# Patient Record
Sex: MALE | Race: WHITE
[De-identification: names, ages, dates, MRNs, and addresses within clinical notes are randomized per-mention and may not be internally consistent; named-entity substitution may affect disease eponyms.]

---

## 2018-10-15 ENCOUNTER — HOSPITAL ENCOUNTER (INPATIENT)
Dept: HOSPITAL 53 - M ED | Age: 63
LOS: 3 days | Discharge: HOME | DRG: 282 | End: 2018-10-18
Attending: INTERNAL MEDICINE | Admitting: INTERNAL MEDICINE
Payer: COMMERCIAL

## 2018-10-15 DIAGNOSIS — J44.9: ICD-10-CM

## 2018-10-15 DIAGNOSIS — K85.90: Primary | ICD-10-CM

## 2018-10-15 DIAGNOSIS — Z90.49: ICD-10-CM

## 2018-10-15 DIAGNOSIS — I10: ICD-10-CM

## 2018-10-15 DIAGNOSIS — M10.9: ICD-10-CM

## 2018-10-15 DIAGNOSIS — Z79.1: ICD-10-CM

## 2018-10-15 DIAGNOSIS — Z79.899: ICD-10-CM

## 2018-10-15 DIAGNOSIS — F17.210: ICD-10-CM

## 2018-10-15 DIAGNOSIS — Z92.21: ICD-10-CM

## 2018-10-15 DIAGNOSIS — Z92.3: ICD-10-CM

## 2018-10-15 DIAGNOSIS — E78.5: ICD-10-CM

## 2018-10-15 DIAGNOSIS — Z85.048: ICD-10-CM

## 2018-10-15 LAB
ALBUMIN/GLOBULIN RATIO: 1.19 (ref 1–1.93)
ALBUMIN: 3.7 GM/DL (ref 3.2–5.2)
ALKALINE PHOSPHATASE: 76 U/L (ref 45–117)
ALT SERPL W P-5'-P-CCNC: 17 U/L (ref 12–78)
AMYLASE SERPL-CCNC: 46 U/L (ref 25–115)
ANION GAP: 9 MEQ/L (ref 8–16)
AST SERPL-CCNC: 11 U/L (ref 7–37)
BASO #: 0.1 10^3/UL (ref 0–0.2)
BASO %: 0.4 % (ref 0–1)
BILIRUB CONJ SERPL-MCNC: 0.3 MG/DL (ref 0–0.2)
BILIRUBIN,TOTAL: 0.7 MG/DL (ref 0.2–1)
BLOOD UREA NITROGEN: 14 MG/DL (ref 7–18)
CALCIUM LEVEL: 9.1 MG/DL (ref 8.8–10.2)
CARBON DIOXIDE LEVEL: 28 MEQ/L (ref 21–32)
CHLORIDE LEVEL: 100 MEQ/L (ref 98–107)
CK MB CFR.DF SERPL CALC: 2.42
CK SERPL-CCNC: 66 U/L (ref 39–308)
CK-MB VALUE MASS: 2 NG/ML (ref ?–3.6)
CREATININE FOR GFR: 0.7 MG/DL (ref 0.7–1.3)
EOS #: 0 10^3/UL (ref 0–0.5)
EOSINOPHIL NFR BLD AUTO: 0.2 % (ref 0–3)
GFR SERPL CREATININE-BSD FRML MDRD: > 60 ML/MIN/{1.73_M2} (ref 49–?)
GLUCOSE, FASTING: 131 MG/DL (ref 70–100)
HEMATOCRIT: 47.8 % (ref 42–52)
HEMOGLOBIN: 15.8 G/DL (ref 13.5–17.5)
IMMATURE GRANULOCYTE %: 0.4 % (ref 0–3)
INR: 0.96
LACTIC ACID SEPSIS PROTOCOL: 1.4 MMOL/L (ref 0.4–2)
LEUKOCYTE ESTERASE UR AUTO RFX: NEGATIVE
LIPASE: 259 U/L (ref 73–393)
LYMPH #: 0.6 10^3/UL (ref 1.5–4.5)
LYMPH %: 3.6 % (ref 24–44)
MEAN CORPUSCULAR HEMOGLOBIN: 30.7 PG (ref 27–33)
MEAN CORPUSCULAR HGB CONC: 33.1 G/DL (ref 32–36.5)
MEAN CORPUSCULAR VOLUME: 93 FL (ref 80–96)
MONO #: 1 10^3/UL (ref 0–0.8)
MONO %: 5.8 % (ref 0–5)
NEUTROPHILS #: 14.7 10^3/UL (ref 1.8–7.7)
NEUTROPHILS %: 89.6 % (ref 36–66)
NRBC BLD AUTO-RTO: 0 % (ref 0–0)
PARTIAL THROMBOPLASTIN TIME: 26.4 SECONDS (ref 25.4–37.6)
PLATELET COUNT, AUTOMATED: 142 10^3/UL (ref 150–450)
POTASSIUM SERUM: 4.2 MEQ/L (ref 3.5–5.1)
PROTHROMBIN TIME: 12.9 SECONDS (ref 12.1–14.4)
RED BLOOD COUNT: 5.14 10^6/UL (ref 4.3–6.1)
RED CELL DISTRIBUTION WIDTH: 12.5 % (ref 11.5–14.5)
SODIUM LEVEL: 137 MEQ/L (ref 136–145)
SPECIFIC GRAVITY UR AUTO RFX: 1.02 (ref 1–1.03)
SQUAM EPITHELIAL CELL UR AURFX: 0 /HPF (ref 0–6)
TOTAL PROTEIN: 6.8 GM/DL (ref 6.4–8.2)
TROPONIN I: < 0.02 NG/ML (ref ?–0.1)
WHITE BLOOD COUNT: 16.4 10^3/UL (ref 4–10)

## 2018-10-15 RX ADMIN — SODIUM CHLORIDE 1 MLS/HR: 9 INJECTION, SOLUTION INTRAVENOUS at 08:19

## 2018-10-15 RX ADMIN — MONTELUKAST SODIUM 1 MG: 10 TABLET, FILM COATED ORAL at 16:43

## 2018-10-15 RX ADMIN — ATORVASTATIN CALCIUM 1 MG: 20 TABLET, FILM COATED ORAL at 16:43

## 2018-10-15 RX ADMIN — ALLOPURINOL 1 MG: 300 TABLET ORAL at 16:43

## 2018-10-15 RX ADMIN — ACETAMINOPHEN 1 MG: 325 TABLET ORAL at 14:48

## 2018-10-15 RX ADMIN — ONDANSETRON 1 MG: 2 INJECTION INTRAMUSCULAR; INTRAVENOUS at 08:19

## 2018-10-15 RX ADMIN — MORPHINE SULFATE 1 MG: 4 INJECTION INTRAVENOUS at 10:19

## 2018-10-15 RX ADMIN — SODIUM CHLORIDE 1 MLS/HR: 9 INJECTION, SOLUTION INTRAVENOUS at 14:11

## 2018-10-15 RX ADMIN — ENOXAPARIN SODIUM 1 MG: 40 INJECTION SUBCUTANEOUS at 14:49

## 2018-10-15 RX ADMIN — MORPHINE SULFATE 1 MG: 4 INJECTION INTRAVENOUS at 17:53

## 2018-10-15 RX ADMIN — MORPHINE SULFATE 1 MG: 2 INJECTION, SOLUTION INTRAMUSCULAR; INTRAVENOUS at 08:19

## 2018-10-15 RX ADMIN — SODIUM CHLORIDE 1 MLS/HR: 9 INJECTION, SOLUTION INTRAVENOUS at 10:45

## 2018-10-15 RX ADMIN — MORPHINE SULFATE 1 MG: 4 INJECTION INTRAVENOUS at 20:45

## 2018-10-15 RX ADMIN — SODIUM CHLORIDE 1 MLS/HR: 9 INJECTION, SOLUTION INTRAVENOUS at 20:45

## 2018-10-15 RX ADMIN — LISINOPRIL 1 MG: 20 TABLET ORAL at 16:43

## 2018-10-15 RX ADMIN — FLUTICASONE PROPIONATE AND SALMETEROL XINAFOATE 1 PUFF: 230; 21 AEROSOL, METERED RESPIRATORY (INHALATION) at 20:03

## 2018-10-16 LAB
ALBUMIN/GLOBULIN RATIO: 0.83 (ref 1–1.93)
ALBUMIN: 3 GM/DL (ref 3.2–5.2)
ALKALINE PHOSPHATASE: 63 U/L (ref 45–117)
ALT SERPL W P-5'-P-CCNC: 12 U/L (ref 12–78)
ANION GAP: 4 MEQ/L (ref 8–16)
AST SERPL-CCNC: 7 U/L (ref 7–37)
BILIRUB CONJ SERPL-MCNC: 0.2 MG/DL (ref 0–0.2)
BILIRUBIN,TOTAL: 0.5 MG/DL (ref 0.2–1)
BLOOD UREA NITROGEN: 14 MG/DL (ref 7–18)
CALCIUM LEVEL: 9 MG/DL (ref 8.8–10.2)
CARBON DIOXIDE LEVEL: 33 MEQ/L (ref 21–32)
CHLORIDE LEVEL: 102 MEQ/L (ref 98–107)
CREATININE FOR GFR: 0.66 MG/DL (ref 0.7–1.3)
GFR SERPL CREATININE-BSD FRML MDRD: > 60 ML/MIN/{1.73_M2} (ref 49–?)
GLUCOSE, FASTING: 119 MG/DL (ref 70–100)
HEMATOCRIT: 44.6 % (ref 42–52)
HEMOGLOBIN: 14.5 G/DL (ref 13.5–17.5)
LIPASE: 86 U/L (ref 73–393)
MAGNESIUM LEVEL: 2 MG/DL (ref 1.8–2.4)
MEAN CORPUSCULAR HEMOGLOBIN: 31 PG (ref 27–33)
MEAN CORPUSCULAR HGB CONC: 32.5 G/DL (ref 32–36.5)
MEAN CORPUSCULAR VOLUME: 95.3 FL (ref 80–96)
NRBC BLD AUTO-RTO: 0 % (ref 0–0)
PLATELET COUNT, AUTOMATED: 129 10^3/UL (ref 150–450)
POTASSIUM SERUM: 4.2 MEQ/L (ref 3.5–5.1)
RED BLOOD COUNT: 4.68 10^6/UL (ref 4.3–6.1)
RED CELL DISTRIBUTION WIDTH: 12.6 % (ref 11.5–14.5)
SODIUM LEVEL: 139 MEQ/L (ref 136–145)
TOTAL PROTEIN: 6.6 GM/DL (ref 6.4–8.2)
WHITE BLOOD COUNT: 15.6 10^3/UL (ref 4–10)

## 2018-10-16 RX ADMIN — ALLOPURINOL 1 MG: 300 TABLET ORAL at 08:45

## 2018-10-16 RX ADMIN — LISINOPRIL 1 MG: 20 TABLET ORAL at 08:45

## 2018-10-16 RX ADMIN — MORPHINE SULFATE 1 MG: 4 INJECTION INTRAVENOUS at 20:26

## 2018-10-16 RX ADMIN — MORPHINE SULFATE 1 MG: 4 INJECTION INTRAVENOUS at 02:48

## 2018-10-16 RX ADMIN — SENNOSIDES 1 TAB: 8.6 TABLET, FILM COATED ORAL at 02:48

## 2018-10-16 RX ADMIN — FLUTICASONE PROPIONATE AND SALMETEROL XINAFOATE 1 PUFF: 230; 21 AEROSOL, METERED RESPIRATORY (INHALATION) at 07:24

## 2018-10-16 RX ADMIN — SODIUM CHLORIDE 1 MLS/HR: 9 INJECTION, SOLUTION INTRAVENOUS at 08:41

## 2018-10-16 RX ADMIN — FLUTICASONE PROPIONATE AND SALMETEROL XINAFOATE 1 PUFF: 230; 21 AEROSOL, METERED RESPIRATORY (INHALATION) at 20:17

## 2018-10-16 RX ADMIN — INFLUENZA A VIRUS A/WISCONSIN/588/2019 (H1N1) RECOMBINANT HEMAGGLUTININ ANTIGEN, INFLUENZA A VIRUS A/DARWIN/6/2021 (H3N2) RECOMBINANT HEMAGGLUTININ ANTIGEN, INFLUENZA B VIRUS B/AUSTRIA/1359417/2021 RECOMBINANT HEMAGGLUTININ ANTIGEN, AND INFLUENZA B VIRUS B/PHUKET/3073/2013 RECOMBINANT HEMAGGLUTININ ANTIGEN 1 ML: 45; 45; 45; 45 INJECTION INTRAMUSCULAR at 08:50

## 2018-10-16 RX ADMIN — MONTELUKAST SODIUM 1 MG: 10 TABLET, FILM COATED ORAL at 08:50

## 2018-10-16 RX ADMIN — SODIUM CHLORIDE 1 MLS/HR: 9 INJECTION, SOLUTION INTRAVENOUS at 02:48

## 2018-10-16 RX ADMIN — ENOXAPARIN SODIUM 1 MG: 40 INJECTION SUBCUTANEOUS at 08:41

## 2018-10-16 RX ADMIN — PNEUMOCOCCAL 13-VALENT CONJUGATE VACCINE 1 ML: 2.2; 2.2; 2.2; 2.2; 2.2; 4.4; 2.2; 2.2; 2.2; 2.2; 2.2; 2.2; 2.2 INJECTION, SUSPENSION INTRAMUSCULAR at 08:49

## 2018-10-16 RX ADMIN — MORPHINE SULFATE 1 MG: 4 INJECTION INTRAVENOUS at 08:42

## 2018-10-16 RX ADMIN — MORPHINE SULFATE 1 MG: 4 INJECTION INTRAVENOUS at 15:34

## 2018-10-16 RX ADMIN — ATORVASTATIN CALCIUM 1 MG: 20 TABLET, FILM COATED ORAL at 08:45

## 2018-10-17 LAB
ALBUMIN/GLOBULIN RATIO: 0.72 (ref 1–1.93)
ALBUMIN: 2.8 GM/DL (ref 3.2–5.2)
ALKALINE PHOSPHATASE: 62 U/L (ref 45–117)
ALT SERPL W P-5'-P-CCNC: 13 U/L (ref 12–78)
ANION GAP: 4 MEQ/L (ref 8–16)
AST SERPL-CCNC: 10 U/L (ref 7–37)
BILIRUB CONJ SERPL-MCNC: 0.2 MG/DL (ref 0–0.2)
BILIRUBIN,TOTAL: 0.6 MG/DL (ref 0.2–1)
BLOOD UREA NITROGEN: 16 MG/DL (ref 7–18)
CALCIUM LEVEL: 8.8 MG/DL (ref 8.8–10.2)
CARBON DIOXIDE LEVEL: 36 MEQ/L (ref 21–32)
CHLORIDE LEVEL: 99 MEQ/L (ref 98–107)
CREATININE FOR GFR: 0.72 MG/DL (ref 0.7–1.3)
CRP SERPL-MCNC: 16.9 MG/DL (ref 0–0.3)
GFR SERPL CREATININE-BSD FRML MDRD: > 60 ML/MIN/{1.73_M2} (ref 49–?)
GLUCOSE, FASTING: 121 MG/DL (ref 70–100)
HEMATOCRIT: 45.2 % (ref 42–52)
HEMOGLOBIN: 14.7 G/DL (ref 13.5–17.5)
LIPASE: 39 U/L (ref 73–393)
MAGNESIUM LEVEL: 1.9 MG/DL (ref 1.8–2.4)
MEAN CORPUSCULAR HEMOGLOBIN: 30.9 PG (ref 27–33)
MEAN CORPUSCULAR HGB CONC: 32.5 G/DL (ref 32–36.5)
MEAN CORPUSCULAR VOLUME: 95 FL (ref 80–96)
NRBC BLD AUTO-RTO: 0 % (ref 0–0)
PLATELET COUNT, AUTOMATED: 143 10^3/UL (ref 150–450)
POTASSIUM SERUM: 4.4 MEQ/L (ref 3.5–5.1)
RED BLOOD COUNT: 4.76 10^6/UL (ref 4.3–6.1)
RED CELL DISTRIBUTION WIDTH: 12.5 % (ref 11.5–14.5)
SODIUM LEVEL: 139 MEQ/L (ref 136–145)
TOTAL PROTEIN: 6.7 GM/DL (ref 6.4–8.2)
WHITE BLOOD COUNT: 11.3 10^3/UL (ref 4–10)

## 2018-10-17 RX ADMIN — FLUTICASONE PROPIONATE AND SALMETEROL XINAFOATE 1 PUFF: 230; 21 AEROSOL, METERED RESPIRATORY (INHALATION) at 09:28

## 2018-10-17 RX ADMIN — ENOXAPARIN SODIUM 1 MG: 40 INJECTION SUBCUTANEOUS at 08:42

## 2018-10-17 RX ADMIN — ALLOPURINOL 1 MG: 300 TABLET ORAL at 08:42

## 2018-10-17 RX ADMIN — ATORVASTATIN CALCIUM 1 MG: 20 TABLET, FILM COATED ORAL at 08:42

## 2018-10-17 RX ADMIN — LISINOPRIL 1 MG: 20 TABLET ORAL at 08:42

## 2018-10-17 RX ADMIN — FLUTICASONE PROPIONATE AND SALMETEROL XINAFOATE 1 PUFF: 230; 21 AEROSOL, METERED RESPIRATORY (INHALATION) at 19:51

## 2018-10-17 RX ADMIN — MONTELUKAST SODIUM 1 MG: 10 TABLET, FILM COATED ORAL at 08:42

## 2018-10-17 RX ADMIN — SENNOSIDES 1 TAB: 8.6 TABLET, FILM COATED ORAL at 00:44

## 2018-10-17 RX ADMIN — ACETAMINOPHEN 1 MG: 325 TABLET ORAL at 21:46

## 2018-10-18 LAB
ANION GAP: 6 MEQ/L (ref 8–16)
BLOOD UREA NITROGEN: 19 MG/DL (ref 7–18)
CALCIUM LEVEL: 8.8 MG/DL (ref 8.8–10.2)
CARBON DIOXIDE LEVEL: 34 MEQ/L (ref 21–32)
CHLORIDE LEVEL: 99 MEQ/L (ref 98–107)
CREATININE FOR GFR: 0.8 MG/DL (ref 0.7–1.3)
CRP SERPL-MCNC: 13.4 MG/DL (ref 0–0.3)
GFR SERPL CREATININE-BSD FRML MDRD: > 60 ML/MIN/{1.73_M2} (ref 49–?)
GLUCOSE, FASTING: 111 MG/DL (ref 70–100)
HEMATOCRIT: 46.7 % (ref 42–52)
HEMOGLOBIN: 15.1 G/DL (ref 13.5–17.5)
LIPASE: 30 U/L (ref 73–393)
MAGNESIUM LEVEL: 1.9 MG/DL (ref 1.8–2.4)
MEAN CORPUSCULAR HEMOGLOBIN: 30.8 PG (ref 27–33)
MEAN CORPUSCULAR HGB CONC: 32.3 G/DL (ref 32–36.5)
MEAN CORPUSCULAR VOLUME: 95.3 FL (ref 80–96)
NRBC BLD AUTO-RTO: 0 % (ref 0–0)
PLATELET COUNT, AUTOMATED: 167 10^3/UL (ref 150–450)
POTASSIUM SERUM: 3.6 MEQ/L (ref 3.5–5.1)
RED BLOOD COUNT: 4.9 10^6/UL (ref 4.3–6.1)
RED CELL DISTRIBUTION WIDTH: 12.6 % (ref 11.5–14.5)
SODIUM LEVEL: 139 MEQ/L (ref 136–145)
WHITE BLOOD COUNT: 6.8 10^3/UL (ref 4–10)

## 2018-10-18 RX ADMIN — FLUTICASONE PROPIONATE AND SALMETEROL XINAFOATE 1 PUFF: 230; 21 AEROSOL, METERED RESPIRATORY (INHALATION) at 07:17

## 2018-10-18 RX ADMIN — ENOXAPARIN SODIUM 1 MG: 40 INJECTION SUBCUTANEOUS at 09:48

## 2018-10-18 RX ADMIN — MONTELUKAST SODIUM 1 MG: 10 TABLET, FILM COATED ORAL at 09:46

## 2018-10-18 RX ADMIN — LISINOPRIL 1 MG: 20 TABLET ORAL at 09:47

## 2018-10-18 RX ADMIN — ALLOPURINOL 1 MG: 300 TABLET ORAL at 09:47

## 2018-10-18 RX ADMIN — ATORVASTATIN CALCIUM 1 MG: 20 TABLET, FILM COATED ORAL at 09:46

## 2019-09-12 ENCOUNTER — HOSPITAL ENCOUNTER (EMERGENCY)
Dept: HOSPITAL 53 - M ED | Age: 64
Discharge: HOME | End: 2019-09-12
Payer: MEDICARE

## 2019-09-12 VITALS — DIASTOLIC BLOOD PRESSURE: 89 MMHG | SYSTOLIC BLOOD PRESSURE: 164 MMHG

## 2019-09-12 VITALS — HEIGHT: 73 IN | WEIGHT: 265.66 LBS | BODY MASS INDEX: 35.21 KG/M2

## 2019-09-12 DIAGNOSIS — Z92.3: ICD-10-CM

## 2019-09-12 DIAGNOSIS — F17.200: ICD-10-CM

## 2019-09-12 DIAGNOSIS — Z79.1: ICD-10-CM

## 2019-09-12 DIAGNOSIS — Z85.048: ICD-10-CM

## 2019-09-12 DIAGNOSIS — K52.9: Primary | ICD-10-CM

## 2019-09-12 DIAGNOSIS — E78.5: ICD-10-CM

## 2019-09-12 DIAGNOSIS — I10: ICD-10-CM

## 2019-09-12 DIAGNOSIS — Z79.51: ICD-10-CM

## 2019-09-12 DIAGNOSIS — Z92.21: ICD-10-CM

## 2019-09-12 DIAGNOSIS — Z79.899: ICD-10-CM

## 2019-09-12 LAB
ALBUMIN SERPL BCG-MCNC: 3.5 GM/DL (ref 3.2–5.2)
ALT SERPL W P-5'-P-CCNC: 16 U/L (ref 12–78)
BASOPHILS # BLD AUTO: 0.1 10^3/UL (ref 0–0.2)
BASOPHILS NFR BLD AUTO: 0.4 % (ref 0–1)
BILIRUB CONJ SERPL-MCNC: 0.2 MG/DL (ref 0–0.2)
BILIRUB SERPL-MCNC: 0.6 MG/DL (ref 0.2–1)
EOSINOPHIL # BLD AUTO: 0.1 10^3/UL (ref 0–0.5)
EOSINOPHIL NFR BLD AUTO: 0.7 % (ref 0–3)
HCT VFR BLD AUTO: 48.4 % (ref 42–52)
HGB BLD-MCNC: 16.1 G/DL (ref 13.5–17.5)
LIPASE SERPL-CCNC: 38 U/L (ref 73–393)
LYMPHOCYTES # BLD AUTO: 0.7 10^3/UL (ref 1.5–5)
LYMPHOCYTES NFR BLD AUTO: 5.8 % (ref 24–44)
MCH RBC QN AUTO: 31.6 PG (ref 27–33)
MCHC RBC AUTO-ENTMCNC: 33.3 G/DL (ref 32–36.5)
MCV RBC AUTO: 95.1 FL (ref 80–96)
MONOCYTES # BLD AUTO: 0.7 10^3/UL (ref 0–0.8)
MONOCYTES NFR BLD AUTO: 5.6 % (ref 0–5)
NEUTROPHILS # BLD AUTO: 10.7 10^3/UL (ref 1.5–8.5)
NEUTROPHILS NFR BLD AUTO: 87.1 % (ref 36–66)
PLATELET # BLD AUTO: 149 10^3/UL (ref 150–450)
PROT SERPL-MCNC: 6.5 GM/DL (ref 6.4–8.2)
RBC # BLD AUTO: 5.09 10^6/UL (ref 4.3–6.1)
WBC # BLD AUTO: 12.3 10^3/UL (ref 4–10)

## 2019-09-12 PROCEDURE — 74177 CT ABD & PELVIS W/CONTRAST: CPT

## 2019-09-12 PROCEDURE — 99284 EMERGENCY DEPT VISIT MOD MDM: CPT

## 2019-09-12 PROCEDURE — 96361 HYDRATE IV INFUSION ADD-ON: CPT

## 2019-09-12 PROCEDURE — 80076 HEPATIC FUNCTION PANEL: CPT

## 2019-09-12 PROCEDURE — 93005 ELECTROCARDIOGRAM TRACING: CPT

## 2019-09-12 PROCEDURE — 96374 THER/PROPH/DIAG INJ IV PUSH: CPT

## 2019-09-12 PROCEDURE — 96376 TX/PRO/DX INJ SAME DRUG ADON: CPT

## 2019-09-12 PROCEDURE — 83690 ASSAY OF LIPASE: CPT

## 2019-09-12 PROCEDURE — 85025 COMPLETE CBC W/AUTO DIFF WBC: CPT

## 2019-09-12 PROCEDURE — 96375 TX/PRO/DX INJ NEW DRUG ADDON: CPT

## 2019-09-12 PROCEDURE — 80047 BASIC METABLC PNL IONIZED CA: CPT

## 2019-09-12 RX ADMIN — MORPHINE SULFATE PRN MG: 4 INJECTION INTRAVENOUS at 11:22

## 2019-09-12 RX ADMIN — MORPHINE SULFATE PRN MG: 4 INJECTION INTRAVENOUS at 12:03

## 2019-09-12 NOTE — ECGEPIP
[facility] - ED

                                       

                                       Test Date:    2019

Pat Name:     SUKHWINDER ESTRADA            Department:   

Patient ID:   [id number]                 Room:         -

Gender:       Male                     Technician:   magdalena

:          1955               Requested By: Yessenia Holcomb 

Order Number: [id number]     Reading MD:   Yessenia Holcomb

                                 Measurements

Intervals                              Axis          

Rate:         83                       P:            55

LA:           147                      QRS:          -70

QRSD:         155                      T:            1

QT:           384                                    

QTc:          453                                    

                           Interpretive Statements

SINUS RHYTHM

RIGHT BUNDLE BRANCH BLOCK

LEFT ANTERIOR FASCICULAR BLOCK

SIMILAR 10/15/18

Electronically Signed on 2019 20:32:01 EDT by Yessenia Holcomb

## 2019-09-12 NOTE — REP
CT ABDOMEN AND PELVIS WITH IV WITHOUT ORAL CONTRAST:

 

HISTORY:  Abdomen pain. The patient gives a history of colorectal carcinoma

status post colon resection.

 

Comparison CT study October 15, 2018.

 

CT CONTRAST DOSE:  100 mL  of intravenous Isovue 370 is administered.

 

CT FINDINGS:  Preliminary  radiograph shows an unremarkable bowel gas

pattern.  Lung bases are clear.  Liver and spleen remain normal in size

homogeneous in texture.  No adrenal lesion is seen.  The kidneys enhance

symmetrically and are morphologically intact.  No pancreatic abnormality is

visible today.

 

There is a 3.9 cm infrarenal abdominal aortic aneurysm again noted unchanged

 

There is evidence of a colonic anastomosis at the level of the rectum suggesting

previous the left colon resection and reanastomosis.  There is extensive

diverticulosis in the distal left colon and to some degree in the descending

colon.

 

There is a prominent new pattern of mural thickening and pericolonic fat

streaking affecting the proximal colon from cecum through to the splenic flexure

consistent with enterocolitis.  There is no evidence of free air or abscess.

Small bowel loops are unremarkable.  Prostate, seminal vesicles and urinary

bladder are unremarkable.

 

IMPRESSION: Moderate new pattern of mural thickening involving the right colon

from cecum to the splenic flexure region.  Enterocolitis picture.  Status post

left colon resection.  A 3.9 cm infrarenal abdominal aortic aneurysm unchanged.

 

 

Electronically Signed by

Chas Williamson MD 09/12/2019 01:02 P

## 2020-10-08 ENCOUNTER — HOSPITAL ENCOUNTER (OUTPATIENT)
Dept: HOSPITAL 53 - M RAD | Age: 65
End: 2020-10-08
Attending: FAMILY MEDICINE
Payer: MEDICARE

## 2020-10-08 DIAGNOSIS — R91.1: ICD-10-CM

## 2020-10-08 DIAGNOSIS — F17.218: Primary | ICD-10-CM

## 2020-10-13 NOTE — REP
CT CHEST WITHOUT CONTRAST: LOW-DOSE SCREENING EXAM 



HISTORY: Tobacco use. Current smoker.



COMPARISON: Chest x-ray from 10/15/2018. No comparison chest CT study.  



CT FINDINGS: 

Preliminary digital  radiograph unremarkable. 



Exam is positive. There are multiple abnormalities, the most compelling of which
is a 4.3 x 2.9 cm spiculated mass adjacent to the posteromedial pleura of the 
right lower lobe. There is no evidence of pleural effusion. 



In addition, there are multiple subcentimeter pulmonary nodules. These include a
right upper lobe nodule measuring 0.8 cm in diameter in the apex on Page 9 of 
103 in Series 201 of todays exam. There is a 3-mm nodule in the left upper lobe
on Page 19. There is a 5-mm nodule in the left lower lobe and a 3-mm nodule in 
the right lower lobe both of which project on Page 30. No other abnormality is 
appreciated.   



IMPRESSION: 

Lung-RADS Category 4X findings. Suspicious right lower lobe lung mass. Recommend
PET CT scanning and histologic sampling. This could be done by CT-guided needle 
biopsy if desired. There are also multiple subcentimeter noncalcified pulmonary 
nodules bilaterally.   

MTDD

## 2021-04-01 ENCOUNTER — HOSPITAL ENCOUNTER (INPATIENT)
Dept: HOSPITAL 53 - M ED | Age: 66
LOS: 2 days | Discharge: HOME | DRG: 603 | End: 2021-04-03
Attending: NEUROMUSCULOSKELETAL MEDICINE & OMM | Admitting: INTERNAL MEDICINE
Payer: MEDICARE

## 2021-04-01 VITALS — DIASTOLIC BLOOD PRESSURE: 77 MMHG | SYSTOLIC BLOOD PRESSURE: 116 MMHG

## 2021-04-01 VITALS — HEIGHT: 73 IN | BODY MASS INDEX: 37.72 KG/M2 | WEIGHT: 284.59 LBS

## 2021-04-01 VITALS — DIASTOLIC BLOOD PRESSURE: 77 MMHG | SYSTOLIC BLOOD PRESSURE: 125 MMHG

## 2021-04-01 DIAGNOSIS — Z92.3: ICD-10-CM

## 2021-04-01 DIAGNOSIS — Z85.048: ICD-10-CM

## 2021-04-01 DIAGNOSIS — I10: ICD-10-CM

## 2021-04-01 DIAGNOSIS — F17.210: ICD-10-CM

## 2021-04-01 DIAGNOSIS — Z92.21: ICD-10-CM

## 2021-04-01 DIAGNOSIS — L03.213: ICD-10-CM

## 2021-04-01 DIAGNOSIS — T50.Z95A: ICD-10-CM

## 2021-04-01 DIAGNOSIS — J44.9: ICD-10-CM

## 2021-04-01 DIAGNOSIS — M10.9: ICD-10-CM

## 2021-04-01 DIAGNOSIS — L03.211: Primary | ICD-10-CM

## 2021-04-01 DIAGNOSIS — M54.9: ICD-10-CM

## 2021-04-01 DIAGNOSIS — E78.5: ICD-10-CM

## 2021-04-01 LAB
ALBUMIN SERPL BCG-MCNC: 3.1 GM/DL (ref 3.2–5.2)
ALT SERPL W P-5'-P-CCNC: 15 U/L (ref 12–78)
APPEARANCE UR: (no result)
BACTERIA UR QL AUTO: NEGATIVE
BASOPHILS # BLD AUTO: 0.1 10^3/UL (ref 0–0.2)
BASOPHILS NFR BLD AUTO: 0.6 % (ref 0–1)
BILIRUB CONJ SERPL-MCNC: < 0.1 MG/DL (ref 0–0.2)
BILIRUB SERPL-MCNC: 0.3 MG/DL (ref 0.2–1)
BILIRUB UR QL STRIP.AUTO: NEGATIVE
EOSINOPHIL # BLD AUTO: 0.1 10^3/UL (ref 0–0.5)
EOSINOPHIL NFR BLD AUTO: 0.7 % (ref 0–3)
GLUCOSE UR QL STRIP.AUTO: NEGATIVE MG/DL
HCT VFR BLD AUTO: 49.8 % (ref 42–52)
HGB BLD-MCNC: 15.6 G/DL (ref 13.5–17.5)
HGB UR QL STRIP.AUTO: NEGATIVE
KETONES UR QL STRIP.AUTO: NEGATIVE MG/DL
LEUKOCYTE ESTERASE UR QL STRIP.AUTO: NEGATIVE
LYMPHOCYTES # BLD AUTO: 0.7 10^3/UL (ref 1.5–5)
LYMPHOCYTES NFR BLD AUTO: 8 % (ref 24–44)
MCH RBC QN AUTO: 30.8 PG (ref 27–33)
MCHC RBC AUTO-ENTMCNC: 31.3 G/DL (ref 32–36.5)
MCV RBC AUTO: 98.4 FL (ref 80–96)
MONOCYTES # BLD AUTO: 0.8 10^3/UL (ref 0–0.8)
MONOCYTES NFR BLD AUTO: 9.6 % (ref 2–8)
MUCOUS THREADS URNS QL MICRO: (no result)
NEUTROPHILS # BLD AUTO: 7 10^3/UL (ref 1.5–8.5)
NEUTROPHILS NFR BLD AUTO: 80.8 % (ref 36–66)
NITRITE UR QL STRIP.AUTO: NEGATIVE
PH UR STRIP.AUTO: 5 UNITS (ref 5–9)
PLATELET # BLD AUTO: 128 10^3/UL (ref 150–450)
PROT SERPL-MCNC: 6.3 GM/DL (ref 6.4–8.2)
PROT UR QL STRIP.AUTO: (no result) MG/DL
RBC # BLD AUTO: 5.06 10^6/UL (ref 4.3–6.1)
RBC # UR AUTO: 2 /HPF (ref 0–3)
SP GR UR STRIP.AUTO: 1.03 (ref 1–1.03)
SQUAMOUS #/AREA URNS AUTO: 0 /HPF (ref 0–6)
UROBILINOGEN UR QL STRIP.AUTO: 2 MG/DL (ref 0–2)
WBC # BLD AUTO: 8.7 10^3/UL (ref 4–10)
WBC #/AREA URNS AUTO: 1 /HPF (ref 0–3)

## 2021-04-01 RX ADMIN — DEXTROSE MONOHYDRATE SCH MLS/HR: 50 INJECTION, SOLUTION INTRAVENOUS at 18:22

## 2021-04-01 RX ADMIN — FLUTICASONE PROPIONATE AND SALMETEROL XINAFOATE SCH PUFF: 230; 21 AEROSOL, METERED RESPIRATORY (INHALATION) at 20:23

## 2021-04-01 RX ADMIN — ENOXAPARIN SODIUM SCH MG: 40 INJECTION SUBCUTANEOUS at 13:39

## 2021-04-01 RX ADMIN — NAPROXEN SCH MG: 250 TABLET ORAL at 13:25

## 2021-04-01 RX ADMIN — ATORVASTATIN CALCIUM SCH MG: 20 TABLET, FILM COATED ORAL at 13:26

## 2021-04-01 RX ADMIN — NAPROXEN SCH MG: 250 TABLET ORAL at 20:46

## 2021-04-01 NOTE — HPEPDOC
[facility] Medical History & Physical


Date of Admission


2021


Date of Service:  2021


Attending Physician:  Maine Hooper MD





History and Physical


CHIEF COMPLAINT: facial swelling 





HISTORY OF PRESENT ILLNESS: 


Patient is a 65-year-old male with PMH of rectal cancer status post chemotherapy

and radiation (), COPD, tobacco use, hyperlipidemia, hypertension, gout 

history, chronic back pain secondary to degenerative disc disease, 

osteoarthritis who presented to [facility] emergency room for increased facial 

cellulitis since 2021. The patient states he received his first Covid 19 

vaccine on 2021. He had very little postvaccination symptoms only 

including some left arm muscle pain. That following Saturday on 2021 the 

patient began feeling some tightness behind his left ear. He noted increased 

redness and swelling which later extended over the last side of the head e

xtending towards the 4 head. It eventually made its way to the right side of his

face around his right ear and head and around both eyes. He saw his primary care

provider on 329 who prescribed him doxycycline. He was compliant with this 

medication. He states the discomfort associated with this as "like a sunburn" 

and it being very "tight" and warm to touch. The patient denies seeing any type 

of lesions on the redness, history of shingles or herpes, exposure to people 

with recent infection, throat closing, tongue or mouth swelling, shortness of 

breath, chest pain, itching, oozing, fevers, chills, nausea, vomiting, diarrhea,

recent changes in meds, recent change in diet, blurry vision, new detergent or 

soaps at home. The patient came to the emergency room today as his antibiotics 

were not seeming to improve his condition and it appeared worse.





In the emergency room vital signs were stable. A CT maxillofacial with contrast 

showed: 1. Preseptal periorbital cellulitis right greater than left but 

extending over the super of a ridge for at and into the temporal regions, no 

definite abscess and there is no subcutaneous air. Labs were essentially 

unremarkable. Patient was started on IV vancomycin. Due to extensive cellulitis 

failing o/p treatment with oral antibiotics, patient was admitted for facial 

cellulitis, bilateral preseptal cellulitis requiring IV antibiotic treatment. 





REVIEW OF SYSTEMS: 


CONSTITUTIONAL: Denies lack of energy, unexplained weight gain or weight loss, 

loss of appetite, fever, night sweats


EYES: Denies eye drainage, eye pain, visual changes, dry/irritated eye


EARS, NOSE, MOUTH, THROAT: Denies difficulty hearing, ringing in ears, mouth 

sores, loose teeth, sore throat, facial numbness or pain


NECK: Denies swollen glands 


CARDIOVASCULAR: Denies irregular heartbeat, racing heart, chest pains, swelling 

of feet or legs, pain in legs with walking


RESPIRATORY: Denies shortness of breath, night sweats, wheezing, sputum 

production, oxygen at home, coughing up blood, cough lasting > 1 month 


GASTROINTESTINAL: Denies abdominal pain, constipation, bloody stool, diarrhea, 

heartburn, nausea, vomiting


GENITOURINARY: Denies painful urination, bloody urine, frequent urination, 

urgency, leaking urine, impotence


MUSCULOSKELETAL: Denies joint pain, muscle pain, leg swelling 


INTEGUMENTARY: Denies  itching, change in existing skin lesion, hair loss or 

increase, breast changes. 


NEUROLOGICAL: Denies headaches, dizziness, difficulty walking, numbness or 

tingling


PSYCHIATRIC: Denies depression, anxiety, recurrent bad thoughts, mood swings, 

hallucinations





PAST MEDICAL HISTORY: 


rectal cancer status post chemotherapy and radiation (2016), COPD, tobacco use, 

hyperlipidemia, hypertension, gout history, chronic back pain secondary to 

degenerative disc disease, osteoarthritis





PAST SURGICAL HISTORY: 


Rectal surgery





FAMILY HISTORY: 


Father: liver cirrhosis 2/2 to alcohol use.  at 59 y/o 


Mother: CAD.  at 44 y/o 





SOCIAL HISTORY: 


Smoker 1 pack per day for 40 years. Patient denies alcohol or drug use. His 

primary care provider's and [facility]. He follows with a 

pulmonologist Dr. Pimentel. He lives alone in the local area and is 

independent without a walker or cane. He is a full code.





ALLERGIES: 


Please see below. 





HOME MEDICATIONS: 


Please see below.





PHYSICAL EXAMINATION: 


VS: Please see below 


CONSTITUTIONAL: No acute distress, resting comfortably, AAO x 3


EYES: PERRLA, EOM intact, corrective lenses in place 


HENT, MOUTH: Please see under integumentary. Moist mucous membranes


NECK: SUPPLE, no JVD, no lymphadenopathy, no carotid bruit


CV: Regular rate and rhythm, S1S2 normal, no murmurs/rubs/gallops


RESPIRATORY: Wheezing b/l, mild.  no rales/rhonchi


GI:  BS positive in 4 quadrants, soft, nontender, nondistended, no rebound or 

guarding, no organomegaly


: Deferred


MUSCULOSKELETAL: Normal ROM. No cyanosis, clubbing, swelling, joint deformity, 

extremity edema


INTEGUMENTARY: Redness, swelling, skin tightening across forehead, in areas on 

scalp b/l, behind right and left ear, around right and left eye. Fluid filled 

bullae under left and right eye, R>L. No open lesions to introduce infection. No

lesions on erythematous base. All red areas are warm to touch, nontender. 


NEUROLOGIC: Cranial Nerves II-XII are intact, no focal deficits


PSYCHIATRIC: Mood and affect are normal 





LABORATORY DATA: 


Please see below 





MICROBIOLOGY: 


BCx x 2 sets ordered 





UA neg 





Resp panel: Pending 





IMAGING: 


CT maxillofacial: 


1. Preseptal periorbital cellulitis right greater than left but extending over 

the super of a ridge for at and into the temporal regions.  I do not see 

definite abscess and there is no subcutaneous air.  Intraorbital fat and 

contents normal.


2. Patient is edentulous and there is a mucous retention cyst in the left 

maxillary antrum 2.3 x  2.3 x 1.8 cm.  Mastoids intact.  All the other sinuses 

are clear. Airway intact.  No adenopathy.





ASSESSMENT: 65-year-old male with PMH of rectal cancer status post chemotherapy 

and radiation (), COPD, tobacco use, hyperlipidemia, hypertension, gout hist

ory, chronic back pain secondary to degenerative disc disease, osteoarthritis 

admitted for facial cellulitis, bilateral preseptal cellulitis requiring IV 

antibiotic treatment. 





PLAN:  





Facial cellulitis, bilateral preseptal cellulitis


-WBC wnl, afebrile, failed o/p abx treatment with doxycycline with extension of 

facial cellulitis 


-Cannot completely rule out reaction to COVID-19 vaccination, as this cellulitis

began 2 days after receiving first dose 


-Not suspecting angioedema, as patient is on ACEi 


-CT maxillofacial above 


-BCx pending


-C/w vancomycin, asking to outline areas of redness to see if extension. 

Monitoring closely for new lesions on existing cellulitic areas evolving, increa

sed pain/burning or extension into the eye. 





COPD 2/2 to tobacco use 


-Mild wheezing on exam, patient states this is his baseline 


-Currently not suspected to be in exacerbation


-C/w home meds, subsituted inhalers 





Tobacco use


-Patient does not wish to have nicotine patch 





HLD


-C/w statin 





HTN


-C/w home meds 





Gout hx 


-Stable 


-C/w home meds 





Chronic back pain secondary to degenerative disc disease / osteoarthritis


-Stable


-C/w home naproxen 





Rectal cancer status post chemotherapy and radiation (2016), rectal surgery


-No reoccurrence 


-F/u with PCP 





DVT px 


-Lovenox 





DISPOSITION: Admitted as acute inpatient. Plan is discharge home when medically 

improved.





Vital Signs





Vital Signs








  Date Time  Temp Pulse Resp B/P (MAP) Pulse Ox O2 Delivery O2 Flow Rate FiO2


 


21 07:36        


 


21 07:21 97.4 85 22  96 Room Air  











Laboratory Data


Labs 24H


Laboratory Tests 2


21 08:34: 


Immature Granulocyte % (Auto) 0.3, Neutrophils (%) (Auto) 80.8H, Lymphocytes (%)

(Auto) 8.0L, Monocytes (%) (Auto) 9.6H, Eosinophils (%) (Auto) 0.7, Basophils 

(%) (Auto) 0.6, Neutrophils # (Auto) 7.0, Lymphocytes # (Auto) 0.7L, Monocytes #

(Auto) 0.8, Eosinophils # (Auto) 0.1, Basophils # (Auto) 0.1, Nucleated Red 

Blood Cells % (auto) 0.0, Urine Color AYDEE, Urine Appearance HAZY, Urine pH 

5.0, Urine Specific Gravity 1.027, Urine Protein 1+H, Urine Glucose (Auto)(UA) 

NEGATIVE, Urine Ketones (Auto) NEGATIVE, Urine Blood NEGATIVE, Urine Nitrite 

NEGATIVE, Urine Bilirubin NEGATIVE, Urine Urobilinogen 2.0H, Urine Leukocyte 

Esterase (Auto) NEGATIVE, Urine WBC (Auto) 1, Urine RBC (Auto) 2, Urine Hyaline 

Casts (Auto) 1, Urine Bacteria (Auto) NEGATIVE, Urine Squamous Epithelial Cells 

0, Urine Mucus (Auto) SMALL, Urine Sperm (Auto) , Total Bilirubin 0.3, Direct 

Bilirubin < 0.1, Aspartate Amino Transf (AST/SGOT) 11, Alanine Aminotransferase 

(ALT/SGPT) 15, Alkaline Phosphatase 82, Total Protein 6.3L, Albumin 3.1L, 

Albumin/Globulin Ratio 1.0


21 08:47: 


POC Glucose (Misc Panel) 116H, POC Sodium (Misc Panel) 138, POC Potassium (Misc 

Panel) 4.4, POC Chloride (Misc Panel) 99, POC Total CO2 (Misc Panel) 32.0H, POC 

Blood Urea Nitrogen (Misc Panel 18, POC Ionized Calcium (Misc Panel) 4.5, POC 

Creatinine (Misc Panel) 0.7, POC Hematocrit (Misc Panel) 49.0


CBC/BMP


Laboratory Tests


21 08:34








Microbiology





Microbiology


21 Respiratory Virus Panel (PCR) ([facility]), Received


         Pending





Home Medications


Scheduled


Allopurinol (Zyloprim) 300 Mg Tab, 300 MG PO DAILY


Amlodipine Besylate (Norvasc) 5 Mg Tablet, 5 MG PO DAILY


Atorvastatin Calcium (Atorvastatin Calcium) 20 Mg Tab, 20 MG PO DAILY


Doxycycline Hyclate (Doxycycline Hyclate) 100 Mg Tablet, 100 MG PO BID


   FOR 10 DAYS, STARTED 3/30 


Fluticasone/Vilanterol (Breo Ellipta 200-25 Mcg INH) 1 Each Blst.w.dev, 1 PUFF 

INH DAILY


Lisinopril (Lisinopril) 40 Mg Tablet, 40 MG PO DAILY


Naproxen (Naproxen) 500 Mg Tab, 500 MG PO BID


Tizanidine HCl (Tizanidine HCl) 4 Mg Tablet, 4 MG PO DAILY





Scheduled PRN


Albuterol Sulfate (Ventolin Hfa) 108 Mcg/Act Aer, 2 PUFF INH Q4H PRN for 

SHORTNESS OF BREATH





Allergies


Coded Allergies:  


     No Known Allergies (Verified , 2/10/11)





A-FIB/CHADSVASC


A-FIB History


Current/History of A-Fib/PAF?:  No


Current PO Anticoag Therapy:  No





Age/Risk Factor Scoring


CHADSVASC:  








CHADSVASC Response (Comments) Value


 


Age Risk Factor Age 65-74 years old 1


 


Gender Risk Factor Male 0


 


Hx of CHF No 0


 


Hx of HTN Yes 1


 


Hx of Stroke/TIA/or VTE No 0


 


Hx of Diabetes No 0


 


Hx of Vascular Disease No 0


 


Total  2











Treatment


Treatment ordered:  Other


Other anticoagulant ordered:  Maine Luevano MD             2021 12:08

## 2021-04-01 NOTE — REP
INDICATION:

cellulitis ?abscess.



COMPARISON:

None.



TECHNIQUE:

Bolus of 75 mL Isovue 370 scanning through the maxillofacial region with coronal and

sagittal reconstructions presented in soft tissue and bone window settings.



FINDINGS:

There is extensive soft tissue swelling in the periorbital soft tissues bilaterally

right greater than left.  This is all preseptal with the intraconal fat, globes and

intraorbital contents all normal.  The skin in the supraorbital ridges extending

peripherally into the temporal regions shows thickening and edema.  I see no

subcutaneous emphysema.  Some involvement of the malar region infraorbital left

greater than right.  The patient is edentulous.  There is a 2.3 x 2.3 x 1.8 cm mucous

retention cyst in the floor the left maxillary antrum with the other sinuses entirely

clear.  Nasal septal deviation towards the left is noted.  The ostiomeatal complexes

are patent.  There are small Deejay cells bilaterally.  Kimberly bullosa in the middle

turbinates with the right greater than left.  Mastoid symmetric and normal.  Nasal

bones, zygomatic arches, orbital floors, sinus walls and visualized portions of the

skull base unremarkable patient is edentulous.  The nasopharyngeal airway, oropharynx

and visualized hypopharynx are unremarkable.  No abnormal thickening of the adenoid

pad.  A portion of intracranial contents visualized were unremarkable in the basal

cisterns intact.  Visualized old lateral 3rd and 4th ventricles are unremarkable there

is no cerebellar tonsillar ectopia.  Some degenerative changes at the C1-2

articulation of dens and anterior arch of C1 but no destructive lesions or fractures

of the craniocervical junction.  The pr I had and submandibular glands are

unremarkable some scattered small nodes in the neck and anterior cervical chain but no

pathologic sized adenopathy.



IMPRESSION:

1. Preseptal periorbital cellulitis right greater than left but extending over the

super of a ridge for at and into the temporal regions.  I do not see definite abscess

and there is no subcutaneous air.  Intraorbital fat and contents normal.

2. Patient is edentulous and there is a mucous retention cyst in the left maxillary

antrum 2.3 x  2.3 x 1.8 cm.  Mastoids intact.  All the other sinuses are clear.

Airway intact.  No adenopathy.





<Electronically signed by Russ Young > 04/01/21 0966

## 2021-04-02 VITALS — SYSTOLIC BLOOD PRESSURE: 118 MMHG | DIASTOLIC BLOOD PRESSURE: 76 MMHG

## 2021-04-02 VITALS — DIASTOLIC BLOOD PRESSURE: 76 MMHG | SYSTOLIC BLOOD PRESSURE: 116 MMHG

## 2021-04-02 VITALS — SYSTOLIC BLOOD PRESSURE: 133 MMHG | DIASTOLIC BLOOD PRESSURE: 88 MMHG

## 2021-04-02 LAB
BUN SERPL-MCNC: 19 MG/DL (ref 7–18)
CALCIUM SERPL-MCNC: 8.4 MG/DL (ref 8.8–10.2)
CHLORIDE SERPL-SCNC: 107 MEQ/L (ref 98–107)
CO2 SERPL-SCNC: 36 MEQ/L (ref 21–32)
CREAT SERPL-MCNC: 0.68 MG/DL (ref 0.7–1.3)
GFR SERPL CREATININE-BSD FRML MDRD: > 60 ML/MIN/{1.73_M2} (ref 49–?)
GLUCOSE SERPL-MCNC: 117 MG/DL (ref 70–100)
HCT VFR BLD AUTO: 46.4 % (ref 42–52)
HGB BLD-MCNC: 14.3 G/DL (ref 13.5–17.5)
MCH RBC QN AUTO: 30.6 PG (ref 27–33)
MCHC RBC AUTO-ENTMCNC: 30.8 G/DL (ref 32–36.5)
MCV RBC AUTO: 99.1 FL (ref 80–96)
PLATELET # BLD AUTO: 131 10^3/UL (ref 150–450)
POTASSIUM SERPL-SCNC: 5.1 MEQ/L (ref 3.5–5.1)
RBC # BLD AUTO: 4.68 10^6/UL (ref 4.3–6.1)
SODIUM SERPL-SCNC: 143 MEQ/L (ref 136–145)
WBC # BLD AUTO: 7 10^3/UL (ref 4–10)

## 2021-04-02 RX ADMIN — LINEZOLID SCH MG: 600 TABLET, FILM COATED ORAL at 11:34

## 2021-04-02 RX ADMIN — FLUTICASONE PROPIONATE AND SALMETEROL XINAFOATE SCH PUFF: 230; 21 AEROSOL, METERED RESPIRATORY (INHALATION) at 20:50

## 2021-04-02 RX ADMIN — LINEZOLID SCH MG: 600 TABLET, FILM COATED ORAL at 20:07

## 2021-04-02 RX ADMIN — NAPROXEN SCH MG: 250 TABLET ORAL at 20:07

## 2021-04-02 RX ADMIN — NAPROXEN SCH MG: 250 TABLET ORAL at 09:22

## 2021-04-02 RX ADMIN — ENOXAPARIN SODIUM SCH MG: 40 INJECTION SUBCUTANEOUS at 09:25

## 2021-04-02 RX ADMIN — FLUTICASONE PROPIONATE AND SALMETEROL XINAFOATE SCH PUFF: 230; 21 AEROSOL, METERED RESPIRATORY (INHALATION) at 07:12

## 2021-04-02 RX ADMIN — POLYVINYL ALCOHOL PRN DROP: 14 SOLUTION/ DROPS OPHTHALMIC at 18:21

## 2021-04-02 RX ADMIN — POLYVINYL ALCOHOL PRN DROP: 14 SOLUTION/ DROPS OPHTHALMIC at 11:34

## 2021-04-02 RX ADMIN — DEXTROSE MONOHYDRATE SCH MLS/HR: 50 INJECTION, SOLUTION INTRAVENOUS at 03:25

## 2021-04-02 RX ADMIN — ATORVASTATIN CALCIUM SCH MG: 20 TABLET, FILM COATED ORAL at 09:23

## 2021-04-02 NOTE — IPNPDOC
Text Note


Date of Service


The patient was seen on 4/2/21.





NOTE


SUBJECTIVE:


The patient seen and evaluated this morning, he reports that his facial swelling

is significantly improved, essentially all of the erythema across his face and 

forehead has resolved, he does continue to have significant edema underneath 

both of his eyes, but is otherwise feeling much better. He denies any fevers, 

chills, night sweats throughout the night last night.





PHYSICAL EXAMINATION: 


VS: Please see below 


CONSTITUTIONAL: No acute distress, resting comfortably, AAO x 3


EYES: PERRLA, EOM intact, corrective lenses in place 


HENT, MOUTH: Please see under integumentary. Moist mucous membranes


NECK: SUPPLE, no JVD, no lymphadenopathy, no carotid bruit


CV: Regular rate and rhythm, S1S2 normal, no murmurs/rubs/gallops


RESPIRATORY: no wheezign, rales/rhonchi


GI:  BS positive in 4 quadrants, soft, nontender, nondistended, no rebound or 

guarding, no organomegaly


: Deferred


MUSCULOSKELETAL: Normal ROM. No cyanosis, clubbing, swelling, joint deformity, 

extremity edema


INTEGUMENTARY: Erythema and edema of the entire face is significantly improved, 

essentially resolved with the exception of a large amount of edema but is still 

present in the soft tissue underneath the bilateral eyes.


NEUROLOGIC: Cranial Nerves II-XII are intact, no focal deficits


PSYCHIATRIC: Mood and affect are normal 





ASSESSMENT: 65-year-old male with PMH of rectal cancer status post chemotherapy 

and radiation (2016), COPD, tobacco use, hyperlipidemia, hypertension, gout 

history, chronic back pain secondary to degenerative disc disease, 

osteoarthritis admitted for facial cellulitis, bilateral preseptal cellulitis 

requiring IV antibiotic treatment having failed outpatient therapy with 

doxycycline. 





PLAN:  





Facial cellulitis, bilateral preseptal cellulitis


-WBC wnl, afebrile, failed o/p abx treatment with doxycycline with extension of 

facial cellulitis 


-Cannot completely rule out reaction to COVID-19 vaccination, as this cellulitis

began 2 days after receiving first dose 


-Not suspecting angioedema, as patient is on ACEi 


-BCx negative after 24 hours


-IV vancomycin seems to be quite effective for him, today will switch him over 

to PO linezolid, and if he continues to do well will d/c him home with this.  

Recommended duration of antibiotics is 7-10 days





COPD 2/2 to tobacco use 


-No wheezing today


-Currently not suspected to be in exacerbation


-C/w home meds, subsituted inhalers 





Tobacco use


-Patient does not wish to have nicotine patch 





HLD


-C/w statin 





HTN


-C/w home meds 





Gout hx 


-Stable 


-C/w home meds 





Chronic back pain secondary to degenerative disc disease / osteoarthritis


-Stable


-C/w home naproxen 





Rectal cancer status post chemotherapy and radiation (2016), rectal surgery


-No reoccurrence 


-F/u with PCP 





DVT px 


-Lovenox 





DISPOSITION: Admitted as acute inpatient. Plan is discharge home when medically 

improved.





VS,Fishbone, I+O


VS, Fishbone, I+O


Laboratory Tests


4/2/21 05:27











Vital Signs








  Date Time  Temp Pulse Resp B/P (MAP) Pulse Ox O2 Delivery O2 Flow Rate FiO2


 


4/2/21 14:00 98.8 80 19 118/76 (90) 90 Room Air  


 


4/2/21 09:00       2.0 














I&O- Last 24 Hours up to 6 AM 


 


 4/2/21





 06:00


 


Intake Total 1940 ml


 


Balance 1940 ml

















SUKHWINDER SALAZAR DO                Apr 2, 2021 18:28

## 2021-04-03 VITALS — SYSTOLIC BLOOD PRESSURE: 143 MMHG | DIASTOLIC BLOOD PRESSURE: 64 MMHG

## 2021-04-03 VITALS — DIASTOLIC BLOOD PRESSURE: 78 MMHG | SYSTOLIC BLOOD PRESSURE: 133 MMHG

## 2021-04-03 LAB
BUN SERPL-MCNC: 16 MG/DL (ref 7–18)
CALCIUM SERPL-MCNC: 8.9 MG/DL (ref 8.8–10.2)
CHLORIDE SERPL-SCNC: 105 MEQ/L (ref 98–107)
CO2 SERPL-SCNC: 32 MEQ/L (ref 21–32)
CREAT SERPL-MCNC: 0.6 MG/DL (ref 0.7–1.3)
GFR SERPL CREATININE-BSD FRML MDRD: > 60 ML/MIN/{1.73_M2} (ref 49–?)
GLUCOSE SERPL-MCNC: 102 MG/DL (ref 70–100)
HCT VFR BLD AUTO: 47.2 % (ref 42–52)
HGB BLD-MCNC: 14.4 G/DL (ref 13.5–17.5)
MCH RBC QN AUTO: 29.8 PG (ref 27–33)
MCHC RBC AUTO-ENTMCNC: 30.5 G/DL (ref 32–36.5)
MCV RBC AUTO: 97.5 FL (ref 80–96)
PLATELET # BLD AUTO: 147 10^3/UL (ref 150–450)
POTASSIUM SERPL-SCNC: 4.5 MEQ/L (ref 3.5–5.1)
RBC # BLD AUTO: 4.84 10^6/UL (ref 4.3–6.1)
SODIUM SERPL-SCNC: 141 MEQ/L (ref 136–145)
WBC # BLD AUTO: 6.9 10^3/UL (ref 4–10)

## 2021-04-03 RX ADMIN — FLUTICASONE PROPIONATE AND SALMETEROL XINAFOATE SCH PUFF: 230; 21 AEROSOL, METERED RESPIRATORY (INHALATION) at 07:18

## 2021-04-03 RX ADMIN — ATORVASTATIN CALCIUM SCH MG: 20 TABLET, FILM COATED ORAL at 10:03

## 2021-04-03 RX ADMIN — ENOXAPARIN SODIUM SCH MG: 40 INJECTION SUBCUTANEOUS at 09:00

## 2021-04-03 RX ADMIN — POLYVINYL ALCOHOL PRN DROP: 14 SOLUTION/ DROPS OPHTHALMIC at 06:20

## 2021-04-03 RX ADMIN — LINEZOLID SCH MG: 600 TABLET, FILM COATED ORAL at 10:07

## 2021-04-03 RX ADMIN — NAPROXEN SCH MG: 250 TABLET ORAL at 10:03

## 2021-04-03 NOTE — DS.PDOC
Discharge Summary


General


Date of Admission


Apr 1, 2021 at 10:25


Date of Discharge


04/03/2021





Discharge Summary


PRIMARY CARE PHYSICIAN: 


Dr. Marah Bryant MD





ATTENDING AT TIME OF DISCHARGE:


Dr. Sukhwinder Mathews, 





DISCHARGE DIAGNOS(E)S: 


Facial cellulitis/bilateral preseptal cellulitis





With comorbid conditions:


COPD (not in exacerbation at this time)


Tobacco use


Hyperlipidemia


Hypertension


Gout


Chronic back pain secondary to DJD/osteoarthritis


History of rectal cancer status post surgery, chemotherapy, and radiation





HPI & HOSPITAL COURSE: 


Patient presented to the emergency department with facial cellulitis having 

failed outpatient treatment with doxycycline.  Maxillofacial CT on admission 

confirmed preseptal periorbital cellulitis without any definite abscess, 

subcutaneous air. He was treated with vancomycin, and showed a remarkable 

improvement within 24 hours. He was switched over to PO linezolid, and continued

to show improvement in the following 24 hours., To the point where the celluliti

s has essentially resolved, and he just has edema in the soft tissues beneath 

both eyes.  He appears stable for discharge at this time, and will be sent home 

with an additional 7 days of linezolid.





PHYSICAL EXAMINATION ON DISCHARGE:


GENERAL: Awake, alert, oriented 3. He still has some areas on his face that 

appear erythematous, almost as if they had been burned (not so much like the 

cellulitis he had previously).  He still does have edema in the soft tissue 

noted bilaterally eyes, but this also does appear to be mildly improved from 

yesterday.


CARDIOVASCULAR EXAMINATION: Regular rate and rhythm, with no rubs, gallops, or 

murmur.


RESPIRATORY EXAMINATION: Clear to auscultation bilaterally with no wheezes, 

rales, or rhonchi.


ABDOMINAL EXAMINATION: Soft, nontender, nondistended. Bowel sounds present.


EXTREMITIES: No clubbing or edema noted. 2+ pulses in the radial bilaterally.





DISPOSITION: 


Home





DISCHARGE INSTRUCTIONS: 


Follow-up with PCP within 7-14 days. Activity as tolerated. Diet as tolerated.  

If symptoms return, or if you experience worsening of your symptoms, please call

your doctor or return to the emergency department.





Vital Signs/I&Os





Vital Signs








  Date Time  Temp Pulse Resp B/P (MAP) Pulse Ox O2 Delivery O2 Flow Rate FiO2


 


4/3/21 10:06  81  133/78    


 


4/3/21 06:18 97.7  20  18 Nasal Cannula 1.0 














I&O- Last 24 Hours up to 6 AM 


 


 4/3/21





 05:59


 


Intake Total 950 ml


 


Output Total 0 ml


 


Balance 950 ml











Laboratory Data


Labs 24H


Laboratory Tests 2


4/3/21 06:14: 


Nucleated Red Blood Cells % (auto) 0.0, Anion Gap 4L, Glomerular Filtration Rate

> 60.0, Calcium Level 8.9


CBC/BMP


Laboratory Tests


4/3/21 06:14











Microbiology





Microbiology


4/1/21 Blood Culture - Preliminary, Resulted


         No Growth after 48 hours. All Specime...


4/1/21 Blood Culture - Preliminary, Resulted


         No Growth after 48 hours. All Specime...


4/1/21 Respiratory Virus Panel (PCR) ([facility]) - Final, Complete





Discharge Medications


Scheduled


Allopurinol (Zyloprim) 300 Mg Tab, 300 MG PO DAILY, (Reported)


Amlodipine Besylate (Norvasc) 5 Mg Tablet, 5 MG PO DAILY, (Reported)


Atorvastatin Calcium (Atorvastatin Calcium) 20 Mg Tab, 20 MG PO DAILY, 

(Reported)


Fluticasone/Vilanterol (Breo Ellipta 200-25 Mcg INH) 1 Each Blst.w.dev, 1 PUFF 

INH DAILY, (Reported)


Linezolid (Linezolid) 600 Mg Tablet, 600 MG PO BID


Lisinopril (Lisinopril) 40 Mg Tablet, 40 MG PO DAILY, (Reported)


Naproxen (Naproxen) 500 Mg Tab, 500 MG PO BID, (Reported)


Tizanidine HCl (Tizanidine HCl) 4 Mg Tablet, 4 MG PO DAILY, (Reported)





Scheduled PRN


Albuterol Sulfate (Ventolin Hfa) 108 Mcg/Act Aer, 2 PUFF INH Q4H PRN for 

SHORTNESS OF BREATH, (Reported)





Allergies


Coded Allergies:  


     No Known Allergies (Verified , 2/10/11)











SUKHWINDER MATHEWS DO                Apr 3, 2021 18:57

## 2021-10-27 ENCOUNTER — HOSPITAL ENCOUNTER (OUTPATIENT)
Dept: HOSPITAL 53 - M PLAIMG | Age: 66
End: 2021-10-27
Attending: FAMILY MEDICINE
Payer: MEDICARE

## 2021-10-27 DIAGNOSIS — Z85.048: Primary | ICD-10-CM

## 2021-10-27 PROCEDURE — 74177 CT ABD & PELVIS W/CONTRAST: CPT

## 2021-10-27 PROCEDURE — 71260 CT THORAX DX C+: CPT

## 2021-10-27 NOTE — REP
INDICATION:

EVAL FOR METASTATIC DX, HX RECTAL CA.



COMPARISON:

Multiple the latest 09/12/2019



TECHNIQUE:

Standard helical technique after the intravenous administration of 100 cc Isovue 370

and oral bowel preparatory contrast administration.



FINDINGS:

The liver, gallbladder, spleen, pancreas, adrenal glands, and kidneys are unchanged.

There is an unchanged simple right renal cyst.  There is an infrarenal abdominal

aortic aneurysm which today measures 4.2 cm.  Previously, this measured 3.9 cm.  There

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  There is

descending colon and sigmoid colon diverticulosis status quo.  Changes seen on the

prior exam consistent with colitis have abated.  The bowel loops and the mesenteries

are otherwise unremarkable.  There is no evidence of a mass or adenopathy.  There is

no free fluid or free air.  Bone window technique throughout the examination shows no

significant change in appearance of the imaged osseous structures.  There are spinal

degenerative changes status quo.  There is multilevel discogenic change.



IMPRESSION:

There is no evidence of acute disease.  The infrarenal abdominal aortic aneurysm has

increased in size as described above.  Other findings as described above.





<Electronically signed by Cy Montague > 10/27/21 [id number]

## 2021-10-27 NOTE — REP
INDICATION:

EVAL FOR METASTATIC DX, HX RECTAL CA



COMPARISON:

10/08/2020 low-dose screening CT examination of the lungs



TECHNIQUE:

Standard helical technique after the intravenous administration of 100 cc Isovue 370



FINDINGS:

There is right hilar adenopathy.  This cannot be compared to the prior exam due to the

technique utilized in obtaining the prior exam.  There are no pleural or pericardial

effusions.  The imaged upper abdomen and imaged osseous structures are within normal

limits.



Evaluation of the lung fields shows a 1.1 cm sized spiculated nodule in the right lung

apex.  This is better imaged using today's standard technique when compared to the

prior low-dose screening technique.  It appears to have increased in size.  There is a

large irregular mass in the right lower lobe which measures 5.9 x 4.1 by 5.1 cm.  This

has increased in size when it previously measured 4.3 x 2.9 cm.  There is a small

nodule in the left lung apex which is unchanged.  There is a new spiculated nodule in

the right lower lobe CP angle which measures 1 by 1 cm.  There is a stable nodule in

the superior segment of the right lower lobe.  There are emphysematous changes status

quo.



IMPRESSION:

1. Lung nodules and right lower lobe lung mass as described.

2. Advanced emphysematous changes.

3. Adenopathy.

4. Other findings as described above.





<Electronically signed by Cy Montague > 10/27/21 [id number]

## 2021-12-10 ENCOUNTER — HOSPITAL ENCOUNTER (EMERGENCY)
Dept: HOSPITAL 53 - M ED | Age: 66
Discharge: HOME | End: 2021-12-10
Payer: MEDICARE

## 2021-12-10 VITALS — HEIGHT: 73 IN | WEIGHT: 283.29 LBS | BODY MASS INDEX: 37.55 KG/M2

## 2021-12-10 VITALS — SYSTOLIC BLOOD PRESSURE: 167 MMHG | DIASTOLIC BLOOD PRESSURE: 95 MMHG

## 2021-12-10 DIAGNOSIS — I10: ICD-10-CM

## 2021-12-10 DIAGNOSIS — I51.7: ICD-10-CM

## 2021-12-10 DIAGNOSIS — J44.1: Primary | ICD-10-CM

## 2021-12-10 DIAGNOSIS — Z82.49: ICD-10-CM

## 2021-12-10 DIAGNOSIS — R91.1: ICD-10-CM

## 2021-12-10 DIAGNOSIS — I45.10: ICD-10-CM

## 2021-12-10 DIAGNOSIS — E78.5: ICD-10-CM

## 2021-12-10 LAB
ALBUMIN SERPL BCG-MCNC: 3.7 GM/DL (ref 3.2–5.2)
ALT SERPL W P-5'-P-CCNC: 18 U/L (ref 12–78)
BASOPHILS # BLD AUTO: 0.1 10^3/UL (ref 0–0.2)
BASOPHILS NFR BLD AUTO: 0.6 % (ref 0–1)
BILIRUB CONJ SERPL-MCNC: 0.2 MG/DL (ref 0–0.2)
BILIRUB SERPL-MCNC: 0.5 MG/DL (ref 0.2–1)
BUN SERPL-MCNC: 15 MG/DL (ref 7–18)
CALCIUM SERPL-MCNC: 9.1 MG/DL (ref 8.8–10.2)
CHLORIDE SERPL-SCNC: 103 MEQ/L (ref 98–107)
CO2 SERPL-SCNC: 33 MEQ/L (ref 21–32)
CREAT SERPL-MCNC: 0.73 MG/DL (ref 0.7–1.3)
CRP SERPL-MCNC: 0.55 MG/DL (ref 0–0.3)
EOSINOPHIL # BLD AUTO: 0.1 10^3/UL (ref 0–0.5)
EOSINOPHIL NFR BLD AUTO: 1.5 % (ref 0–3)
ERYTHROCYTE [SEDIMENTATION RATE] IN BLOOD BY WESTERGREN METHOD: 17 MM/HR (ref 0–20)
GFR SERPL CREATININE-BSD FRML MDRD: > 60 ML/MIN/{1.73_M2} (ref 49–?)
GLUCOSE SERPL-MCNC: 147 MG/DL (ref 70–100)
HCT VFR BLD AUTO: 45.4 % (ref 42–52)
HGB BLD-MCNC: 14.5 G/DL (ref 13.5–17.5)
LIPASE SERPL-CCNC: 47 U/L (ref 73–393)
LYMPHOCYTES # BLD AUTO: 0.9 10^3/UL (ref 1.5–5)
LYMPHOCYTES NFR BLD AUTO: 9.5 % (ref 24–44)
MCH RBC QN AUTO: 30.8 PG (ref 27–33)
MCHC RBC AUTO-ENTMCNC: 31.9 G/DL (ref 32–36.5)
MCV RBC AUTO: 96.4 FL (ref 80–96)
MONOCYTES # BLD AUTO: 0.5 10^3/UL (ref 0–0.8)
MONOCYTES NFR BLD AUTO: 5 % (ref 2–8)
NEUTROPHILS # BLD AUTO: 7.7 10^3/UL (ref 1.5–8.5)
NEUTROPHILS NFR BLD AUTO: 82.9 % (ref 36–66)
PLATELET # BLD AUTO: 150 10^3/UL (ref 150–450)
POTASSIUM SERPL-SCNC: 4.1 MEQ/L (ref 3.5–5.1)
PROT SERPL-MCNC: 6.8 GM/DL (ref 6.4–8.2)
RBC # BLD AUTO: 4.71 10^6/UL (ref 4.3–6.1)
SODIUM SERPL-SCNC: 140 MEQ/L (ref 136–145)
WBC # BLD AUTO: 9.3 10^3/UL (ref 4–10)

## 2021-12-10 PROCEDURE — 71275 CT ANGIOGRAPHY CHEST: CPT

## 2021-12-10 PROCEDURE — 93041 RHYTHM ECG TRACING: CPT

## 2021-12-10 PROCEDURE — 94760 N-INVAS EAR/PLS OXIMETRY 1: CPT

## 2021-12-10 PROCEDURE — 84484 ASSAY OF TROPONIN QUANT: CPT

## 2021-12-10 PROCEDURE — 85652 RBC SED RATE AUTOMATED: CPT

## 2021-12-10 PROCEDURE — 80076 HEPATIC FUNCTION PANEL: CPT

## 2021-12-10 PROCEDURE — 93005 ELECTROCARDIOGRAM TRACING: CPT

## 2021-12-10 PROCEDURE — 80048 BASIC METABOLIC PNL TOTAL CA: CPT

## 2021-12-10 PROCEDURE — 80047 BASIC METABLC PNL IONIZED CA: CPT

## 2021-12-10 PROCEDURE — 71045 X-RAY EXAM CHEST 1 VIEW: CPT

## 2021-12-10 PROCEDURE — 99285 EMERGENCY DEPT VISIT HI MDM: CPT

## 2021-12-10 PROCEDURE — 86140 C-REACTIVE PROTEIN: CPT

## 2021-12-10 PROCEDURE — 85025 COMPLETE CBC W/AUTO DIFF WBC: CPT

## 2021-12-10 PROCEDURE — 83690 ASSAY OF LIPASE: CPT

## 2021-12-10 PROCEDURE — 96374 THER/PROPH/DIAG INJ IV PUSH: CPT

## 2021-12-10 NOTE — REP
INDICATION:

CHEST PAIN.



COMPARISON:

10/15/2018.



TECHNIQUE:

Single portable AP view of the chest was performed.



FINDINGS:

There is cardiomegaly and chronic venous hypertension.There is no definite acute

infiltrate.  The mediastinal silhouette is unchanged.  There is elevation of the right

hemidiaphragm.



IMPRESSION:

No evidence of acute infiltrate.  Cardiomegaly.





<Electronically signed by Efrem Thacker > 12/10/21 [id number]

## 2021-12-10 NOTE — CCD
Summarization Of Episode

                             Created on: 12/10/2021



SUKHWINDER SETRADA

External Reference #: [id number]

: 1955

Sex: Undifferentiated



Demographics





                          Address                   [address] [place] [address]  [address]

 

                          Home Phone                [phone]

 

                          Preferred Language        English

 

                          Marital Status            Unknown

 

                          Church Affiliation     Unknown

 

                          Race                      Unknown

 

                          Ethnic Group              Not  or 





Author





                          Author                    [facility]

 

                          Organization              [facility]

 

                          Address                   Unknown

 

                          Phone                     Unavailable







Support





                Name            Relationship    Address         Phone

 

                RE              Next Of Kin     Unknown         Unavailable

 

                DISABLED        Next Of Kin     Unknown         Unavailable

 

                    O BENJI  GLORIA    Next Of Kin         [address] [place]  [address]                   [phone]

 

                    TALYAHARRISON SMITHIS      Next Of Kin         [address] [facility], [address]                               [phone]

 

                    HARRISON ENCISOLIS     Next Of Kin         [address] [place]  [address]                   [phone]

 

                    IMTIAZ WILDE         Next Of Kin         [address]  [address] [phone]

 

                UE              Next Of Kin     Unknown         Unavailable

 

                    LA NENA ESTRADA      Next Of Kin         [address] [place]  [address]                   [phone]







Care Team Providers





                    Care Team Member Name Role                Phone

 

                    JEANETTE Bryant MD Unavailable         Unavailable

 

                    Marah Bryant MD, ND Unavailable         [phone]

6

 

                    Marah Bryant MD, ND Unavailable         [phone]

6

 

                    Marah Bryant MD, ND Unavailable         [phone]

6

 

                    Marah Bryant MD, ND Unavailable         [phone]

6

 

                    Marah Bryant MD, ND Unavailable         [phone]

6

 

                    Marah Bryant MD, ND Unavailable         [phone]

6

 

                    Marah Bryant MD, ND Unavailable         [phone]

6

 

                    Marah Bryant MD, ND Unavailable         [phone]

6

 

                    Marah Bryant MD, ND Unavailable         [phone]

6

 

                    Marah Bryant MD, ND Unavailable         [phone]

6

 

                    Marah Bryant MD, ND Unavailable         [phone]

6

 

                    Manny WALLERMarah ND Unavailable         +

6

 

                    Manny WALLERMarah ND Unavailable         +

6

 

                    Manny WALLER,  Marah ND Unavailable         +

6

 

                    Manny WALLER,  Marah ND Unavailable         +

6

 

                    Manny WALLER,  Marah ND Unavailable         +

6

 

                    Manny WALLER,  Marah ND Unavailable         +

6

 

                    Manny WALLER,  Marah ND Unavailable         +

6

 

                    Manny WALLER,  Marah ND Unavailable         +

6

 

                    Manny WALLER,  Marah ND Unavailable         +

6

 

                    Manny WALLER,  Marah ND Unavailable         +

6

 

                    Manny WALLER,  Marah ND Unavailable         +

6

 

                    Manny WALLER,  Marah ND Unavailable         +

6

 

                    Manny WALLER,  Marah ND Unavailable         +

6

 

                    Manny WALLERGuerdaly ND Unavailable         +

6

 

                    Manny WALLER,  Marah ND Unavailable         +

6

 

                    Manny WALLER,  Marah ND Unavailable         +

6

 

                    Manny WALLERGuerdaly ND Unavailable         +

6

 

                    Manny WALLERGuerdaly ND Unavailable         +

6

 

                    Manny WALLER,  Marah ND Unavailable         +

6

 

                    Manny WALLER,  Marah ND Unavailable         +

6

 

                    Manny WALLER,  Marah ND Unavailable         +

6

 

                    Manny WALLER,  Marah ND Unavailable         +

6

 

                    ZACH Curran MD Unavailable         [phone]

 

                    ZACH Curran MD Unavailable         [phone]

 

                    ZACH Curran MD Unavailable         [phone]

 

                    ZACH Curran MD Unavailable         [phone]

 

                    ZACH Curran MD Unavailable         [phone]



                                  



Re-disclosure Warning

          The records that you are about to access may contain information from 
federally-assisted alcohol or drug abuse programs. If such information is 
present, then the following federally mandated warning applies: This information
has been disclosed to you from records protected by federal confidentiality 
rules (42 CFR part 2). The federal rules prohibit you from making any further 
disclosure of this information unless further disclosure is expressly permitted 
by the written consent of the person to whom it pertains or as otherwise 
permitted by 42 CFR part 2. A general authorization for the release of medical 
or other information is NOT sufficient for this purpose. The Federal rules 
restrict any use of the information to criminally investigate or prosecute any 
alcohol or drug abuse patient.The records that you are about to access may 
contain highly sensitive health information, the redisclosure of which is 
protected by Article 27-F of the [facility] law. If you 
continue you may have access to information: Regarding HIV / AIDS; Provided by 
facilities licensed or operated by the [facility] Office of Mental Health; 
or Provided by the [facility] Office for People With Developmental 
Disabilities. If such information is present, then the following [place] 
mandated warning applies: This information has been disclosed to you from 
confidential records which are protected by state law. State law prohibits you 
from making any further disclosure of this information without the specific 
written consent of the person to whom it pertains, or as otherwise permitted by 
law. Any unauthorized further disclosure in violation of state law may result in
a fine or [facility] sentence or both. A general authorization for the release of 
medical or other information is NOT sufficient authorization for further disc
losure.                                                                         
    



Encounters

          



           Encounter  Providers  Location   Date       Indications Data Source(s

)

 

                Outpatient      Attender: Marah Bryant MD ED-HCCDEKPCP    1

2021 09:32:00 AM 

EST - 2021 09:33:00 AM [facility]

 

                                        Patient discharged. 

 

                    Outpatient          Attender: Marah Bryant MDAttender: 

Marah Bryant MD 

ED-HCCDEKPCP        2021 11:12:00 AM EDT - 2021 11:13:00 AM EDT     

                

[facility]

 

                                        Patient discharged. 

 

                Outpatient      Attender: Marah Bryant MD ED-HCCDEKPCP    0

2021 12:49:00 PM 

EDT - 2021 12:50:00 PM EDT                           [facility]

 

                                        Patient discharged. 

 

                Outpatient      Attender: Basilio Curran MD CPSCAORT-CPSGNORT  01:42:00 PM 

EDT - 2021 01:43:00 PM EDT                           [facility]

al

 

                                        Patient discharged. 

 

                Outpatient      Attender: Marah Bryant MD ED-IMAGH        0

2021 09:39:00 AM EDT - 

2021 09:40:00 AM EDT PAIN IN RT KNEE           [facility]

 

                                        PAIN IN RT KNEE 

 

                                        Patient discharged. 

 

                Outpatient      Attender: Marah Bryant MD ED-IMAG        0

2021 10:41:00 AM EDT - 

2021 10:42:00 AM EDT [id number]                    [facility]

 

                                        [id number] 

 

                                        Patient discharged. 

 

                Outpatient      Attender: Marah Bryant MD ED-HCCDEKPCP    0

2021 10:16:00 AM 

EDT - 2021 10:17:00 AM EDT                           [facility]

 

                                        Patient discharged. 

 

                Outpatient      Attender: Marah Bryant MD ED-HCCDEKPCP    0

3/30/2021 10:05:00 AM 

EDT - 2021 10:06:00 AM EDT                           [facility]

 

                                        Patient discharged. 

 

                Outpatient      Attender: Marah Bryant MD ED-HCCDEKPCP    0

3/23/2021 09:49:00 AM 

EDT - 2021 09:50:00 AM EDT                           [facility]

 

                                        Patient discharged. 

 

                Outpatient      Attender: Marah Bryant MD ED-HCCDEKPCP    1

2020 09:47:00 AM 

EST - 2020 09:48:00 AM [facility]

 

                                        Patient discharged. 

 

                    Outpatient          Attender: Marah Bryant MDAttender: 

Marah Bryant MD 

ED-HCCDEKPCP        10/14/2020 09:39:00 AM EDT - 10/14/2020 09:40:00 AM EDT     

                

[facility]

 

                                        Patient discharged. 



                                                                                
                                                                                
                          



Immunizations

          



             Vaccine      Date         Status       Description  Data Source(s)

 

             COVID-19 VACCINE Moderna 2021 12:00:00 AM EST completed      

           [facility]

 

                                        Vaccine Series Complete: YESThis Data wa

s Submitted to [facility]. 

 

             COVID-19 VACCINE Moderna 2021 12:00:00 AM EDT completed      

           [facility]

 

                                        Vaccine Series Complete: YESThis Data wa

s Submitted to [facility] [employer]. 

 

             COVID-19 VACC,MRNA(MODERNA)/PF 2021 12:00:00 AM EDT completed

                 [facility]

 

                    COVID-19 VACCINE, MRNA-1273, LNP-S (MODERNA)/PF 2021 1

2:00:00 AM EDT 

completed                                           [facility]

 

             COVID-19 VACCINE Moderna 2021 12:00:00 AM EDT completed      

           [facility]

 

                                        Vaccine Series Complete: NOThis Data was

 Submitted to [facility] [employer]. 



                                                                                
                                               



Medications

          



          Medication Brand Name Start Date Product Form Dose      Route     Admi

nistrative 

Instructions Pharmacy Instructions Status     Indications Reaction   Description

 Data 

Source(s)

 

          100 mcg/0.5 mL           2021 12:00:00 AM EST suspension 0      

             INJECT AS DIRECTED 

(THIRD DOSE) INJECT AS DIRECTED (THIRD DOSE) SOLD: 2021                   

               [facility]

 

                                        POLYETHYLENE GLYCOL 3350 850135 MG / Pot

assium Chloride 2970 MG / Sodium 

Bicarbonate 6740 MG / Sodium Chloride 5860 MG / sodium sulfate 55283 MG Powder 
for Oral Solution [Gavilyte-G] 236-22.74-6.74 -5.86 gram [id number]/SOD 

SULF,BICARB,CL/KCL 10/20/2021 12:00:00 AM EDT recon soln   4000                 

     TAKE AS DIRECTED

BY DR MEJIA TAKE AS DIRECTED BY DR MEJIA SOLD: 2021                   

               [facility]

 

                montelukast 10 MG Oral Tablet MONTELUKAST SODIUM 10/13/2021 12:0

0:00 AM EDT 

tablet          90                              TAKE ONE TABLET BY MOUTH EVERY D

AY TAKE ONE TABLET BY MOUTH EVERY 

DAY          SOLD: 10/14/2021                                        [facility]

s

 

           tizanidine 4 MG Oral Tablet TIZANIDINE HCL 10/13/2021 12:00:00 AM EDT

 tablet     90         

                    TAKE ONE TABLET BY MOUTH EVERY DAY TAKE ONE TABLET BY MOUTH 

EVERY DAY SOLD: 

10/14/2021                                                      [facility]

 

          5 mg                10/13/2021 12:00:00 AM EDT tablet    90           

       TAKE ONE TABLET BY MOUTH EVERY DAY

           TAKE ONE TABLET BY MOUTH EVERY DAY SOLD: 10/14/2021                  

                [facility]

 

                                        30 ACTUAT fluticasone furoate 0.1 MG/ACT

UAT / vilanterol 0.025 MG/ACTUAT Dry 

Powder Inhaler [Breo] 100-25 mcg/dose FLUTICASONE/VILANTEROL    10/13/2021 12:00

:00

 AM EDT      blister with device 180                       INHALE 1 PUFF BY MOUT

H ONCE DAILY INHALE 1 

PUFF BY MOUTH ONCE DAILY SOLD: 10/14/2021                                       

 [facility]

 

          500 mg              10/13/2021 12:00:00 AM EDT tablet    180          

       TAKE TWO TABLETS BY MOUTH EVERY

 DAY       TAKE TWO TABLETS BY MOUTH EVERY DAY SOLD: 10/14/2021                 

                 [facility]

 

          40 mg               10/13/2021 12:00:00 AM EDT tablet    90           

       TAKE ONE TABLET BY MOUTH EVERY 

DAY        TAKE ONE TABLET BY MOUTH EVERY DAY SOLD: 10/14/2021                  

                [facility]

 

           Cephalexin 500 MG Oral Capsule CEPHALEXIN 2021 12:00:00 AM EDT 

capsule    28         

                          TAKE ONE CAPSULE BY MOUTH FOUR TIMES A DAY FOR 7 DAYS 

TAKE ONE CAPSULE BY 

MOUTH FOUR TIMES A DAY FOR 7 DAYS SOLD: 2021                              

          [facility]

 

          600 mg              2021 12:00:00 AM EDT tablet    14           

       TAKE ONE TABLET BY MOUTH TWICE A

 DAY       TAKE ONE TABLET BY MOUTH TWICE A DAY SOLD: 2021                

                  [facility]

 

                    doxycycline hyclate 100 MG Oral Tablet DOXYCYCLINE HYCLATE 0

3/30/2021 12:00:00 

AM EDT       tablet       20                        TAKE ONE TABLET BY MOUTH TWI

CE A DAY FOR 10 DAYS TAKE ONE 

TABLET BY MOUTH TWICE A DAY FOR 10 DAYS SOLD: 2021                        

                [facility]

 

          90 mcg/actuation           2021 12:00:00 AM EDT HFA aerosol inha

ler 54                  INHALE 

TWO PUFFS BY MOUTH EVERY 4 HOURS AS NEEDED INHALE TWO PUFFS BY MOUTH EVERY 4 

HOURS AS NEEDED SOLD: 2021                                        [facility]

 

          40 mg               2020 12:00:00 AM EDT tablet    90           

       TAKE ONE TABLET BY MOUTH EVERY 

DAY        TAKE ONE TABLET BY MOUTH EVERY DAY SOLD: 2020                  

                [facility]

 

          40 mg               2020 12:00:00 AM EDT tablet    90           

       TAKE ONE TABLET BY MOUTH EVERY 

DAY        TAKE ONE TABLET BY MOUTH EVERY DAY SOLD: 2021                  

                [facility]

 

          40 mg               2020 12:00:00 AM EDT tablet    90           

       TAKE ONE TABLET BY MOUTH EVERY 

DAY        TAKE ONE TABLET BY MOUTH EVERY DAY SOLD: 2021                  

                [facility]

 

          300 mg              2020 12:00:00 AM EDT tablet    90           

       TAKE ONE TABLET BY MOUTH EVERY 

DAY        TAKE ONE TABLET BY MOUTH EVERY DAY SOLD: 2021                  

                [facility]

 

           tizanidine 4 MG Oral Tablet TIZANIDINE HCL 2020 12:00:00 AM EDT

 tablet     90         

                    TAKE ONE TABLET BY MOUTH EVERY DAY TAKE ONE TABLET BY MOUTH 

EVERY DAY SOLD: 

2021                                                      [facility]

 

                atorvastatin 20 MG Oral Tablet ATORVASTATIN CALCIUM 2020 1

2:00:00 AM EDT 

tablet       90                        TAKE 1 TABLET BY MOUTH ONCE DAILY TAKE 1 

TABLET BY MOUTH ONCE DAILY 

SOLD: 2021                                                 [facility]

 

           tizanidine 4 MG Oral Tablet TIZANIDINE HCL 2020 12:00:00 AM EDT

 tablet     90         

                    TAKE ONE TABLET BY MOUTH EVERY DAY TAKE ONE TABLET BY MOUTH 

EVERY DAY SOLD: 

2020                                                      [facility]

 

          300 mg              2020 12:00:00 AM EDT tablet    90           

       TAKE ONE TABLET BY MOUTH EVERY 

DAY        TAKE ONE TABLET BY MOUTH EVERY DAY SOLD: 2020                  

                [facility]

 

           tizanidine 4 MG Oral Tablet TIZANIDINE HCL 2020 12:00:00 AM EDT

 tablet     90         

                    TAKE ONE TABLET BY MOUTH EVERY DAY TAKE ONE TABLET BY MOUTH 

EVERY DAY SOLD: 

2021                                                      [facility]

 

                atorvastatin 20 MG Oral Tablet ATORVASTATIN CALCIUM 2020 1

2:00:00 AM EDT 

tablet       90                        TAKE 1 TABLET BY MOUTH ONCE DAILY TAKE 1 

TABLET BY MOUTH ONCE DAILY 

SOLD: 2021                                                 [facility]

 

          300 mg              2020 12:00:00 AM EDT tablet    90           

       TAKE ONE TABLET BY MOUTH EVERY 

DAY        TAKE ONE TABLET BY MOUTH EVERY DAY SOLD: 2021                  

                [facility]

 

                montelukast 10 MG Oral Tablet MONTELUKAST SODIUM 2020 12:0

0:00 AM EDT 

tablet          90                              TAKE ONE TABLET BY MOUTH EVERY D

AY TAKE ONE TABLET BY MOUTH EVERY 

DAY          SOLD: 2021                                        [facility]

s

 

          100-25 mcg/dose           2020 12:00:00 AM EDT blister with neo

ce 180                 INHALE 

ONE PUFF BY MOUTH ONCE DAILY INHALE ONE PUFF BY MOUTH ONCE DAILY SOLD: 

2021                                                      [facility]

 

          5 mg                2020 12:00:00 AM EDT tablet    90           

       TAKE ONE TABLET BY MOUTH EVERY DAY

           TAKE ONE TABLET BY MOUTH EVERY DAY SOLD: 2020                  

                [facility]

 

                                        30 ACTUAT fluticasone furoate 0.1 MG/ACT

UAT / vilanterol 0.025 MG/ACTUAT Dry 

Powder Inhaler [Breo] 100-25 mcg/dose FLUTICASONE/VILANTEROL    2020 12:00

:00

 AM EDT      blister with device 180                       INHALE ONE PUFF BY MO

UTH ONCE DAILY INHALE ONE

 PUFF BY MOUTH ONCE DAILY SOLD: 2021                                      

  [facility]

 

          5 mg                2020 12:00:00 AM EDT tablet    90           

       TAKE ONE TABLET BY MOUTH EVERY DAY

           TAKE ONE TABLET BY MOUTH EVERY DAY SOLD: 2021                  

                [facility]

 

          500 mg              2020 12:00:00 AM EDT tablet    180          

       TAKE ONE TABLET BY MOUTH TWICE 

A DAY      TAKE ONE TABLET BY MOUTH TWICE A DAY SOLD: 2021                

                  [facility]

 

          100-25 mcg/dose           2020 12:00:00 AM EDT blister with neo

ce 180                 INHALE 

ONE PUFF BY MOUTH ONCE DAILY INHALE ONE PUFF BY MOUTH ONCE DAILY SOLD: 

2020                                                      [facility]

 

          5 mg                2020 12:00:00 AM EDT tablet    90           

       TAKE ONE TABLET BY MOUTH EVERY DAY

           TAKE ONE TABLET BY MOUTH EVERY DAY SOLD: 2021                  

                [facility]

 

                montelukast 10 MG Oral Tablet MONTELUKAST SODIUM 2020 12:0

0:00 AM EDT 

tablet          90                              TAKE ONE TABLET BY MOUTH EVERY D

AY TAKE ONE TABLET BY MOUTH EVERY 

DAY          SOLD: 2020                                        [facility]

s

 

                montelukast 10 MG Oral Tablet MONTELUKAST SODIUM 2020 12:0

0:00 AM EDT 

tablet          90                              TAKE ONE TABLET BY MOUTH EVERY D

AY TAKE ONE TABLET BY MOUTH EVERY 

DAY          SOLD: 2021                                        [facility]

s

 

          500 mg              2020 12:00:00 AM EDT tablet    180          

       TAKE ONE TABLET BY MOUTH TWICE 

A DAY      TAKE ONE TABLET BY MOUTH TWICE A DAY SOLD: 2020                

                  [facility]



                                                                                
                                                                                
                                                                                
                                                                                
                                                                              



Insurance Providers

          



             Payer name   Policy type / Coverage type Policy ID    Covered party

 ID Covered 

party's relationship to herrera Policy Herrera             Plan Information

 

          [employer]    H         [id number]           Self                [id number]

 

          [facility]       I         [id number]           Self                [id number]

 

          MEDICAID            [id number]            S                   [id number]

 

          MEDICARE            [id number]           S                   [id number]

[id number]

 

          MEDICAID            [id number]            S                   [id number]

 

          MEDICARE            [id number]           S                   [id number]

A75

 

          [employer]              [id number]            SP                  [id number]

 

          [facility] [employer] [facility]           [id number]           SP           

       [id number]

 

          MEDICAID            [id number]                                [id number]

 

          MEDICARE  C         [id number] S                   [id number]

[id number]

 

          [facility]) O         [id number] S              

     [id number]

 

          [facility]           [id number]           SP             

     [id number]

 

          [facility]           [id number]           SP             

     [id number]

 

          MEDICARE            [id number]           SP                  [id number]

A75

 

          MEDICARE            [id number]           SP                  [id number]

A

 

          [facility]           [id number]           S                   [id number]

[id number]

 

          [employer] MEDICA[employer] M[employer] HMO   [id number]           S            

       [id number]

 

          [facility] PLAN           [id number]           [id number]          

        [id number]

 

          [employer]             [id number]

[id number]

 

          [facility] MEDICAID           [id number]            SP                  [id number]

C

 

                              [id number]                                [id number]

 

          MEDICARE            [id number]           SP                  [id number]

[id number]



                                                                                
                                                                                
                                                                                
                                                          



Problems, Conditions, and Diagnoses

          



           Code       Display Name Description Problem Type Effective Dates Data

 Source(s)

 

                          Z85.048                   Personal history of other ma

lignant neoplasm of rectum, rectosigmoid 

junction, and anus        PRSNL HX OF MALIG NEOPLM OF RECTUM, RECTOSIG JUNCT, AN

D ANUS 

Diagnosis                 2021 11:12:00 AM [facility]

 

             Z23          Encounter for immunization ENCOUNTER FOR IMMUNIZATION 

Diagnosis    2021 

11:12:00 AM [facility]

 

                    G47.33              Obstructive sleep apnea (adult) (pediatr

ic) OBSTRUCTIVE SLEEP APNEA 

(ADULT) (PEDIATRIC) Diagnosis           2021 11:12:00 AM [facility]

tung

 

                          S83.511D                  Sprain of anterior cruciate 

ligament of right knee, subsequent 

encounter           SPRAIN OF ANTERIOR CRUCIATE LIGAMENT OF RIGHT KNEE, SUBS Maddy

gnosis           

2021 11:12:00 AM [facility]

 

             E78.5        Hyperlipidemia, unspecified HYPERLIPIDEMIA, UNSPECIFIE

D Diagnosis    

2021 11:12:00 AM [facility]

 

             I10          Essential (primary) hypertension ESSENTIAL (PRIMARY) H

YPERTENSION Diagnosis    

2021 11:12:00 AM [facility]

 

                    J44.9               Chronic obstructive pulmonary disease, u

nspecified CHRONIC OBSTRUCTIVE 

PULMONARY DISEASE, UNSPECIFIED Diagnosis           2021 11:12:00 AM [facility]

 

                          Z00.00                    Encounter for general adult 

medical examination without abnormal findings

                    ENCNTR FOR GENERAL ADULT MEDICAL EXAM W/O ABNORMAL FINDINGS 

Diagnosis           

2021 11:12:00 AM [facility]

 

             L03.115      Cellulitis of right lower limb CELLULITIS OF RIGHT LOW

ER LIMB Diagnosis    

2021 12:49:00 PM [facility]

 

                Y92.9           Unspecified place or not applicable UNSPECIFIED 

PLACE OR NOT APPLICABLE 

Diagnosis                 2021 01:42:00 PM [facility]

 

                    X58.XXXA            Exposure to other specified factors, ini

tial encounter EXPOSURE TO 

OTHER SPECIFIED FACTORS, INITIAL ENCOUNTER Diagnosis           2021 01:42:

00 PM [facility]

 

                          M23.203                   Derangement of unspecified m

edial meniscus due to old tear or injury, 

right knee          DERANG OF UNSP MEDIAL MENISCUS DUE TO OLD TEAR/INJ, R KNEE D

iagnosis           

2021 01:42:00 PM [facility]

 

                    M17.31              Unilateral post-traumatic osteoarthritis

, right knee UNILATERAL POST-

TRAUMATIC OSTEOARTHRITIS, RIGHT KNEE Diagnosis           2021 01:42:00 PM 

[facility]

 

                    S83.521A            Sprain of posterior cruciate ligament of

 right knee, initial encounter 

SPRAIN OF POSTERIOR CRUCIATE LIGAMENT OF RIGHT KNEE, INIT Diagnosis             

    2021 

01:42:00 PM [facility]

 

                    S83.511A            Sprain of anterior cruciate ligament of 

right knee, initial encounter 

SPRAIN OF ANTERIOR CRUCIATE LIGAMENT OF RIGHT KNEE, INIT Diagnosis              

   2021 

01:42:00 PM [facility]

 

                    R91.8               Other nonspecific abnormal finding of armando

ng field OTHER NONSPECIFIC 

ABNORMAL FINDING OF LUNG FIELD Diagnosis           10/14/2020 09:39:00 AM [facility]



                                                                                
                                                                                
                                                                                
        



Surgeries/Procedures

          



             Procedure    Description  Date         Indications  Data Source(s)

 

                          Hospital outpatient clinic visit for assessment and ma

nagement of a patient 

Hospital Outpatient Clinic Visit 2021 12:00:00 AM [facility]

 

                    COLLECTION VENOUS BLOOD VENIPUNCTURE ROUTINE VENIPUNCTURE  12:00:00 AM

 [facility]

 

             Administration of pneumococcal vaccine              2021 12:0

0:00 AM [facility]

 

             Administration of influenza virus vaccine              2021 1

2:00:00 AM [facility]

 

                    BLOOD COUNT COMPLETE AUTO&AUTO DIFRNTL WBC COUNT COMPLETE CB

C W/AUTO DIFF WBC 

2021 12:00:00 AM [facility]

 

             LIPID PANEL  LIPID PANEL  2021 12:00:00 AM [facility]

 

                    COMPREHENSIVE METABOLIC PANEL COMPREHEN METABOLIC PANEL 09/3

 12:00:00 AM 

[facility]

 

                    INFLUENZA VACCINE SPLT PRSRV FREE INC ANTIGEN IM IIV NO PRSV

 INCREASED AG IM 

2021 12:00:00 AM [facility]

 

                    PNEUMOCOCCAL CONJ VACCINE 13 VALENT IM PCV13 VACCINE IM    0

2021 12:00:00 AM 

[facility] outpatient clinic visit for assessment and ma

nagement of a patient 

Hospital Outpatient Clinic Visit 2021 12:00:00 AM [facility]

 

             PROTHROMBIN TIME PROTHROMBIN TIME 10/14/2020 12:00:00 AM [facility]

 

                    BASIC METABOLIC PANEL CALCIUM TOTAL METABOLIC PANEL TOTAL CA

 10/14/2020 12:00:00

 AM [facility]



                                                                                
                                                                                
                                      



Results

          



                    ID                  Date                Data Source

 

                    [id number] 2021 03:57:00 PM [facility]









          Name      Value     Range     Interpretation Code Description Data Lamar

rce(s) Supporting 

Document(s)

 

           Sodium     141 mmol/L 136-145    Normal (applies to non-numeric resul

ts)            [facility]                                 

 

           Potassium             3.5-5.1    Normal (applies to non-numeric resul

ts)            [facility]                                 

 

           Chloride   103 mmol/L      Normal (applies to non-numeric resul

ts)            [facility]                                 

 

          Carbon Dioxide CO2           21-32     Above high normal           [facility]  

 

           Anion Gap             5.0-16.0   Normal (applies to non-numeric resul

ts)            [facility]                                 

 

          BUN       15 mg/dL  7-18      Normal (applies to non-numeric results) 

          [facility] 

 

 

          Creatinine,Serum           0.8-1.5   Below low normal           [facility]  

 

          GFR                 >60       Normal (applies to non-numeric results) 

          [facility]  

 

           Glucose Level 82 mg/dL   60-99      Normal (applies to non-numeric re

sults)            

[facility]                      

 

                                        Reference range is only applicable when 

patient is fasting    Note the following

 drug interference:    Sulfasalazine                  Sulfapyridine  Can see 
falsely depressed      Can see falsely elevated  result with up to 17%          
results with up to 11%  decrease in measurement        increase in measurement  
  Recommend patients be collected for this test prior to administration of 
either drug. 

 

           Calcium               8.5-10.1   Normal (applies to non-numeric resul

ts)            [facility]

                                         

 

           Bilirubin,Total            0.1-1.9    Normal (applies to non-numeric 

results)            [facility]                                 

 

          SGOT(AST) 13 U/L    15-37     Below low normal           [facility]

tung  

 

                                        Note the following drug interference:   

 Sulfasalazine                  

Sulfapyridine  Can see falsely depressed      Can see falsely elevated  result 
with up to 10%          results with up to 10%  decrease in measurement        
increase in measurement    Recommend patients be collected for this test prior 
to administration of either drug. 

 

           SGPT(ALT)  18 U/L     12-78      Normal (applies to non-numeric resul

ts)            [facility]                                 

 

                                        Note the following drug interference:   

 Sulfasalazine                  

Sulfapyridine  Can see falsely depressed      Can see falsely elevated  result 
with up to 29%          results with up to 10%  decrease in measurement        
increase in measurement    Recommend patients be collected for this test prior 
to administration of either drug. 

 

           Alkaline Phosphatase 91 U/L          Normal (applies to non-num

anne results)            

[facility]                      

 

                                        Pregnancy can increase Alkaline Phosp le

vels up to 2 times the normal adult 

value.      Normal values for children and adolescents are 2 to 3 times the 
normal adult value. 

 

           Total Protein            6.0-8.2    Normal (applies to non-numeric re

sults)            [facility]                                 

 

           Albumin Level            3.4-5.0    Normal (applies to non-numeric re

sults)            [facility]                                 









                    ID                  Date                Data Source

 

                    [id number] 2021 03:57:00 PM EDT [facility]









          Name      Value     Range     Interpretation Code Description Data Lamar

rce(s) Supporting 

Document(s)

 

           Triglycerides 38 mg/dL   <150       Normal (applies to non-numeric re

sults)            [facility]                                

 

           Cholesterol 119 mg/dL  100-200    Normal (applies to non-numeric resu

lts)            

[facility]                      

 

           LDL Cholesterol Calculated 77         0-130      Normal (applies to n

on-numeric results)            

[facility]                      

 

          HDL Cholesterol 34 mg/dL  40-60     Below low normal           [facility]  

 

          Cholesterol/HDL Ratio           3.6-6.7   Below low normal           [facility]  









                    ID                  Date                Data Source

 

                    [id number] 2021 03:40:00 PM EDT [facility]









          Name      Value     Range     Interpretation Code Description Data Lamar

rce(s) Supporting 

Document(s)

 

           White Blood Count            3.5-10.5   Normal (applies to non-numeri

c results)            

[facility]                      

 

           Red Blood Count            4.30-5.70  Normal (applies to non-numeric 

results)            [facility]                                

 

           Hemoglobin            13.5-17.5  Normal (applies to non-numeric resul

ts)            [facility]                                 

 

           Hematocrit            38.8-50.0  Normal (applies to non-numeric resul

ts)            [facility]                                 

 

          Mean Corpuscular Volume           81.2-95.1 Above high normal         

  [facility]  

 

           Mean Corpuscular Hgb            25.6-32.2  Normal (applies to non-num

anne results)            

[facility]                      

 

          Mean Corpuscular Hgb Conc           32.0-36.0 Below low normal        

   [facility]  

 

             Red Cell Distribution Width              11.8-15.6    Normal (appli

es to non-numeric results) 

                          [facility]        

 

           Platelet Count 155 x10 3/uL 150-450    Normal (applies to non-numeric

 results)            

[facility]                      

 

           Mean Platelet Volume            9.4-12.4   Normal (applies to non-num

anne results)            

[facility]                      

 

          Neutrophils% (Auto)           31.0-71.0 Above high normal           [facility]  

 

          Lymphocytes% (Auto)           20.0-55.0 Below low normal           [facility]  

 

           Monocytes% (Auto)            4.0-12.0   Normal (applies to non-numeri

c results)            

[facility]                      

 

           Eosinophils% (Auto)            1.0-8.0    Normal (applies to non-nume

baldev results)            

[facility]                      

 

           Basophils% (Auto)            0.0-2.0    Normal (applies to non-numeri

c results)            [facility]                                

 

             Immature Granulocytes% (Auto)              0.0-2.0      Normal (karley

lies to non-numeric results) 

                          [facility]        

 

           Neutrophils# (Auto)            1.50-8.50  Normal (applies to non-nume

baldev results)            

[facility]                      

 

          Lymphocytes# (Auto)           1.20-4.00 Below low normal           [facility]  

 

           Monocytes# (Auto)            0.00-0.90  Normal (applies to non-numeri

c results)            

[facility]                      

 

           Eosinophils# (Auto)            0.00-0.50  Normal (applies to non-nume

baldev results)            

[facility]                      

 

           Basophils# (Auto)            0.00-0.20  Normal (applies to non-numeri

c results)            

[facility]                      

 

                Immature Granulocytes# (Auto)                 0.00-7.00       No

rmal (applies to non-numeric 

results)                                [facility]  









                    ID                  Date                Data Source

 

                    [id number]          2021 10:31:00 AM EDT [facility]   Imaging Services 

Department   Imaging Report   [address], [place] [address]   [phone]       
%(RAD)RES..mtdd.print.filter("line")   Name: SUKHWINDER ESTRADA   : 1955 
Age/Sex: 65M   Ordering Provider: Marah Bryant MD  Med Rec #: [id number]
  Account #: [id number]  Reg Status: DEP REF Room #:   Date of Service: 21 
  Report Number: [id number]  
________________________________________________________________________________

_________  cc:Marah Bryant MD  Send Report To:     [id number] MRI/MRI 
Knee Rt No Contrast    Reason for exam: PAIN IN RT KNEE    Comparison is made to
 x-ray 2021    FINDINGS:  The ACL is torn.  There is partial tear of the 
PCL.  The medial and lateral collateral ligaments are intact.  Quadriceps and 
patellar tendon unremarkable.  Joint effusion.  Medial meniscal tear posterior 
horn.  Lateral meniscal tear posterior horn.  No abnormal marrow signal.      
IMPRESSION:   Torn ACL.  Partial tear PCL.  Medial meniscal tear posterior horn.
 Lateral meniscal tear posterior horn.  Joint effusion.       Time portable 
performed:   Fluoroscopy time in seconds:   Number of Exposures:    Contrast 
Agent in ml:          Method of Administration:                                 
                    **REPORT SIGNATURE ON FILE**   Reported By: Vivek Domingo DO 
  <Electronically signed by Vivek Domingo DO> 21 1327     Dictation 
Date/Time: 21 1401  Transcribed Date/Time: 21 1031  
: HIM.[email or web address] 









          Name      Value     Range     Interpretation Code Description Data Lamar

rce(s) Supporting 

Document(s)

 

                                                                       









                    ID                  Date                Data Source

 

                    [id number]          2021 12:59:00 PM EDT [facility]   Imaging Services 

Department   Imaging Report   [address], [place] [address]   [phone]       
%(RAD)RES..mtdd.print.filter("line")   Name: SUKHWINDER ESTRADA   : 1955 
Age/Sex: 65M   Ordering Provider: Marah Bryant MD  Med Rec #: [id number]
  Account #: [id number]  Reg Status: [facility] REF Room #:   Date of Service: 21 
  Report Number: [id number]  
________________________________________________________________________________

_________  cc:Marah Bryant MD  Send Report To:     [id number] XRP/XR 
Knee Rt Min. 4 Views    Reason for exam: RT KNEE PAIN    FINDINGS: No fracture 
or dislocation is identified.  There is moderate to severejoint space narrowing 
at the medial compartment of the knee and moderate at the lateral compartment.  
There is mild joint space narrowing at the patellofemoral joint.  There is 
moderate spurring at the medial and lateral compartments of theknee and mild to 
moderate at the patella.  No suspicious osseous lesion is seen. There is no 
joint effusion.    IMPRESSION:  NO FRACTURE/DISLOCATION.  MODERATE DJD.        
Time portable performed:   Fluoroscopy time in seconds:   Number of Exposures:  
  Contrast Agent in ml:          Method of Administration:                      
                               **REPORT SIGNATURE ON FILE**   Reported By: 
Clement Sanchez MD   <Electronically signed by Clement Sanchez MD> 21
 1153     Dictation Date/Time: 21 1052  Transcribed Date/Time: 21 
1259  : HIM.[email or web address] 









          Name      Value     Range     Interpretation Code Description Data Lamar

rce(s) Supporting 

Document(s)

 

                                                                       









                    ID                  Date                Data Source

 

                    [id number]             2021 10:51:00 AM [facility]









          Name      Value     Range     Interpretation Code Description Data Lamar

rce(s) Supporting 

Document(s)

 

          SARS-CoV-2 (COVID 19) NEGATIVE - SARS-CoV-2 (COVID19)                 

              [facility]     

 

                                        This lab was ordered by [facility] a

nd reported by [facility].

 









                    ID                  Date                Data Source

 

                    [id number] 10/14/2020 04:28:00 PM EDT [facility]









          Name      Value     Range     Interpretation Code Description Data Lamar

rce(s) Supporting 

Document(s)

 

          PT                  9.2-11.7  Normal (applies to non-numeric results) 

          [facility]  

 

          INR                           Normal (applies to non-numeric results) 

          [facility]  

 

                                        The use of INR is restricted to patients

 on stable oral anticoagulant.    

Therapeutic Range:  2.0 - 3.0    High Risk Range:  2.5 - 3.5 









                    ID                  Date                Data Source

 

                    [id number] 10/14/2020 04:02:00 PM EDT [facility]









          Name      Value     Range     Interpretation Code Description Data Lamar

rce(s) Supporting 

Document(s)

 

           Sodium     137 mmol/L 136-145    Normal (applies to non-numeric resul

ts)            [facility]                                 

 

           Potassium             3.5-5.1    Normal (applies to non-numeric resul

ts)            [facility]                                 

 

           Chloride   98 mmol/L       Normal (applies to non-numeric resul

ts)            [facility]                                 

 

          Carbon Dioxide CO2           21-32     Above high normal           [facility]  

 

           Anion Gap             5.0-16.0   Normal (applies to non-numeric resul

ts)            [facility]                                 

 

          BUN       14 mg/dL  7-18      Normal (applies to non-numeric results) 

          [facility] 

 

 

           Creatinine,Serum            0.8-1.5    Normal (applies to non-numeric

 results)            [facility]                                 

 

          GFR                 >60       Normal (applies to non-numeric results) 

          [facility]  

 

           Glucose Level 93 mg/dL   60-99      Normal (applies to non-numeric re

sults)            

[facility]                      

 

                                        Reference range is only applicable when 

patient is fasting    Note the following

 drug interference:    Sulfasalazine                  Sulfapyridine  Can see 
falsely depressed      Can see falsely elevated  result with up to 17%          
results with up to 11%  decrease in measurement        increase in measurement  
  Recommend patients be collected for this test prior to administration of 
either drug. 

 

           Calcium               8.5-10.1   Normal (applies to non-numeric resul

ts)            [facility]

                                         









                    ID                  Date                Data Source

 

                    [id number] 10/14/2020 04:01:00 PM EDT [facility]









          Name      Value     Range     Interpretation Code Description Data Lamar

rce(s) Supporting 

Document(s)

 

           White Blood Count            3.5-10.5   Normal (applies to non-numeri

c results)            

[facility]                      

 

           Red Blood Count            4.30-5.70  Normal (applies to non-numeric 

results)            [facility]                                

 

           Hemoglobin            13.5-17.5  Normal (applies to non-numeric resul

ts)            [facility]                                 

 

           Hematocrit            38.8-50.0  Normal (applies to non-numeric resul

ts)            [facility]                                 

 

          Mean Corpuscular Volume           81.2-95.1 Above high normal         

  [facility]  

 

           Mean Corpuscular Hgb            25.6-32.2  Normal (applies to non-num

anne results)            

[facility]                      

 

          Mean Corpuscular Hgb Conc           32.0-36.0 Below low normal        

   [facility]  

 

             Red Cell Distribution Width              11.8-15.6    Normal (appli

es to non-numeric results) 

                          [facility]        

 

           Platelet Count 173 x10 3/uL 150-450    Normal (applies to non-numeric

 results)            

[facility]                      

 

           Mean Platelet Volume            9.4-12.4   Normal (applies to non-num

anne results)            

[facility]                      

 

          Neutrophils% (Auto)           31.0-71.0 Above high normal           [facility]  

 

          Lymphocytes% (Auto)           20.0-55.0 Below low normal           [facility]  

 

           Monocytes% (Auto)            4.0-12.0   Normal (applies to non-numeri

c results)            

[facility]                      

 

           Eosinophils% (Auto)            1.0-8.0    Normal (applies to non-nume

baldev results)            

[facility]                      

 

           Basophils% (Auto)            0.0-2.0    Normal (applies to non-numeri

c results)            [facility]                                

 

             Immature Granulocytes% (Auto)              0.0-2.0      Normal (karley

lies to non-numeric results) 

                          [facility]        

 

          Neutrophils# (Auto)           1.50-6.20 Above high normal           [facility]  

 

           Lymphocytes# (Auto)            1.20-4.00  Normal (applies to non-nume

baldev results)            

[facility]                      

 

           Monocytes# (Auto)            0.00-0.90  Normal (applies to non-numeri

c results)            

[facility]                      

 

           Eosinophils# (Auto)            0.00-0.50  Normal (applies to non-nume

baldev results)            

[facility]                      

 

           Basophils# (Auto)            0.00-0.20  Normal (applies to non-numeri

c results)            

[facility]                      

 

                Immature Granulocytes# (Auto)                 0.00-7.00       No

rmal (applies to non-numeric 

results)                                [facility]  







                                        Procedure

 

                                          



                                                                                
                                                                                
                  



Social History

          No Information

## 2021-12-10 NOTE — REPVR
PROCEDURE INFORMATION: 

Exam: CTA Chest With Contrast 

Exam date and time: 12/10/2021 5:17 PM 

Age: 66 years old 

Clinical indication: Pain; Chest pressure; Additional info: Chest pain with 

SOB; R/O pe 



TECHNIQUE: 

Imaging protocol: Computed tomographic angiography of the chest with contrast. 

3D rendering (Not supervised by radiologist): MIP and/or 3D reconstructed 

images were created by the technologist. 

Radiation optimization: All CT scans at this facility use at least one of these 

dose optimization techniques: automated exposure control; mA and/or kV 

adjustment per patient size (includes targeted exams where dose is matched to 

clinical indication); or iterative reconstruction. 

Contrast material: ISOVUE 370; Contrast volume: 75 ml; Contrast route: 

INTRAVENOUS (IV);  



COMPARISON: 

CT Chest with contrast 10/27/2021 10:40 AM 



FINDINGS: 

Pulmonary arteries: There is enlargement of the central pulmonary arteries, 

findings which can be associated with pulmonary arterial hypertension which 

should be correlated clinically. The proximal intralobar pulmonary artery and 

lower lobe segmental arteries on the right are encased by the dominant 

pulmonary parenchymal mass without significant stenosis. There are no pulmonary 

emboli. 

Aorta: There is fusiform dilatation of the supravalvular ascending thoracic 

aorta which measures 4.4 cm. maximally. There is no dissection or saccular 

component. There is mild atherosclerosis in the thoracic aorta. 



Lungs: There is moderate paraseptal and centrilobular emphysematous changes 

demonstrated which are most pronounced in the upper lung zones. Smooth bordered 

noncalcified angular shaped nodule abutting the minor fissure in the right 

lower lobe measures 4 x 6 x 3.4 mm, likely postinflammatory based on 

appearance. 

Pleural spaces: Small spiculated lesion in the right pulmonary apex measures 8 

x 6.5 x 10.5 mm with extension to the posterior pleural surface. Findings 

stable in comparison to the prior study. Large infrahilar lobular and 

spiculated pulmonary parenchymal mass in the right lower lobe measures 3.8 x 

5.9 x 5.1 cm, stable in appearance. Spiculated nodule right lung base measures 

7.1 x 9.6 x 11 mm, a small 3.4 x 2.4 x 2.8 mm nodule in the left upper lobe, 

and another in the superior segment of the left lower lobe measuring 3.7 x 4.4 

x 6 mm, both also stable in appearance. 

Heart: There is mild atherosclerotic calcification of the coronary arteries. 

Lymph nodes: Multiple mediastinal lymph nodes. 



Bones/joints: The spine demonstrates mild degenerative changes. 

Soft tissues: Unremarkable. 



IMPRESSION: 

1. 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 

in the right lower lobe with multiple foci of hematogenous spread. No 

significant interval change in the size of the lesions. 

2. There is moderate paraseptal and centrilobular emphysematous changes 

demonstrated which are most pronounced in the upper lung zones.  No acute 

infiltrates.

3. There is fusiform dilatation of the supravalvular ascending thoracic aorta 

which measures 4.4 cm. maximally. There is no dissection or saccular component. 

4. There is enlargement of the central pulmonary arteries, findings which can 

be associated with pulmonary arterial hypertension which should be correlated 

clinically. 

5. The proximal intralobar pulmonary artery and lower lobe segmental arteries 

on the right are encased by the dominant pulmonary parenchymal mass without 

significant stenosis. 

6. There are no pulmonary emboli. 



Electronically signed by: Zackary Brady On 12/10/2021  18:15:40 PM

## 2021-12-10 NOTE — CCD
Summarization Of Episode

                             Created on: 12/10/2021



SUKHWINDER ESTRADA

External Reference #: [id number]

: 1955

Sex: Undifferentiated



Demographics





                          Address                   [address] [place] [address]  [address]

 

                          Home Phone                [phone]

 

                          Preferred Language        English

 

                          Marital Status            Unknown

 

                          Taoist Affiliation     Unknown

 

                          Race                      Unknown

 

                          Ethnic Group              Not  or 





Author





                          Author                    [facility]

 

                          Organization              [facility]

 

                          Address                   Unknown

 

                          Phone                     Unavailable







Support





                Name            Relationship    Address         Phone

 

                RE              Next Of Kin     Unknown         Unavailable

 

                DISABLED        Next Of Kin     Unknown         Unavailable

 

                    O BENJI  GLORIA    Next Of Kin         [address] [place]  [address]                   [phone]

 

                    TALYAHARRISON SMITHIS      Next Of Kin         [address] [facility], [address]                               [phone]

 

                    HARRISON ENCISOLIS     Next Of Kin         [address] [place]  [address]                   [phone]

 

                    IMTIAZ WILDE         Next Of Kin         [address]  [address] [phone]

 

                UE              Next Of Kin     Unknown         Unavailable

 

                    LA NENA ESTRADA      Next Of Kin         [address] [place]  [address]                   [phone]







Care Team Providers





                    Care Team Member Name Role                Phone

 

                    JEANETTE Bryant MD Unavailable         Unavailable

 

                    Marah Bryant MD, ND Unavailable         [phone]

6

 

                    Marah Bryant MD, ND Unavailable         [phone]

6

 

                    Marah Bryant MD, ND Unavailable         [phone]

6

 

                    Marah Bryant MD, ND Unavailable         [phone]

6

 

                    Marah Bryant MD, ND Unavailable         [phone]

6

 

                    Marah Bryant MD, ND Unavailable         [phone]

6

 

                    Marah Bryant MD, ND Unavailable         [phone]

6

 

                    Marah Bryant MD, ND Unavailable         [phone]

6

 

                    Marah Bryant MD, ND Unavailable         [phone]

6

 

                    Marah Bryant MD, ND Unavailable         [phone]

6

 

                    Marah Bryant MD, ND Unavailable         [phone]

6

 

                    Manny WALLERMarah ND Unavailable         +

6

 

                    Manny WALLERMarah ND Unavailable         +

6

 

                    Manny WALLER,  Marah ND Unavailable         +

6

 

                    Manny WALLER,  Marah ND Unavailable         +

6

 

                    Manny WALLER,  Marah ND Unavailable         +

6

 

                    Manny WALLER,  Marah ND Unavailable         +

6

 

                    Manny WALLER,  Marah ND Unavailable         +

6

 

                    Manny WALLER,  Marah ND Unavailable         +

6

 

                    Manny WALLER,  Marah ND Unavailable         +

6

 

                    Manny WALLER,  Marah ND Unavailable         +

6

 

                    Manny WALLER,  Marah ND Unavailable         +

6

 

                    Manny WALLER,  Marah ND Unavailable         +

6

 

                    Manny WALLER,  Marah ND Unavailable         +

6

 

                    Manny WALLERGuerdaly ND Unavailable         +

6

 

                    Manny WALLER,  Marah ND Unavailable         +

6

 

                    Manny WALLER,  Marah ND Unavailable         +

6

 

                    Manny WALLERGuerdaly ND Unavailable         +

6

 

                    Manny WALLERGuerdaly ND Unavailable         +

6

 

                    Manny WALLER,  Marah ND Unavailable         +

6

 

                    Manny WALLER,  Marah ND Unavailable         +

6

 

                    Manny WALLER,  Marah ND Unavailable         +

6

 

                    Manny WALLER,  Marah ND Unavailable         +

6

 

                    ZACH Curran MD Unavailable         [phone]

 

                    ZACH Curran MD Unavailable         [phone]

 

                    ZACH Curran MD Unavailable         [phone]

 

                    ZACH Curran MD Unavailable         [phone]

 

                    ZACH Curran MD Unavailable         [phone]



                                  



Re-disclosure Warning

          The records that you are about to access may contain information from 
federally-assisted alcohol or drug abuse programs. If such information is 
present, then the following federally mandated warning applies: This information
has been disclosed to you from records protected by federal confidentiality 
rules (42 CFR part 2). The federal rules prohibit you from making any further 
disclosure of this information unless further disclosure is expressly permitted 
by the written consent of the person to whom it pertains or as otherwise 
permitted by 42 CFR part 2. A general authorization for the release of medical 
or other information is NOT sufficient for this purpose. The Federal rules 
restrict any use of the information to criminally investigate or prosecute any 
alcohol or drug abuse patient.The records that you are about to access may 
contain highly sensitive health information, the redisclosure of which is 
protected by Article 27-F of the [facility] Public Health law. If you 
continue you may have access to information: Regarding HIV / AIDS; Provided by 
facilities licensed or operated by the [facility] Office of Mental Health; 
or Provided by the [facility] Office for People With Developmental 
Disabilities. If such information is present, then the following [place] 
mandated warning applies: This information has been disclosed to you from 
confidential records which are protected by state law. State law prohibits you 
from making any further disclosure of this information without the specific 
written consent of the person to whom it pertains, or as otherwise permitted by 
law. Any unauthorized further disclosure in violation of state law may result in
a fine or custodial sentence or both. A general authorization for the release of 
medical or other information is NOT sufficient authorization for further disc
losure.                                                                         
    



Encounters

          



           Encounter  Providers  Location   Date       Indications Data Source(s

)

 

                Outpatient      Attender: Marah Bryant MD ED-HCCDEKPCP    1

2021 09:32:00 AM 

EST - 2021 09:33:00 AM [facility]

 

                                        Patient discharged. 

 

                    Outpatient          Attender: Marah Bryant MDAttender: 

Marah Bryant MD 

ED-HCCDEKPCP        2021 11:12:00 AM EDT - 2021 11:13:00 AM EDT     

                

[facility]

 

                                        Patient discharged. 

 

                Outpatient      Attender: Marah Bryant MD ED-HCCDEKPCP    0

2021 12:49:00 PM 

EDT - 2021 12:50:00 PM EDT                           [facility]

 

                                        Patient discharged. 

 

                Outpatient      Attender: Basilio Curran MD CPSCAORT-CPSGNORT  01:42:00 PM 

EDT - 2021 01:43:00 PM EDT                           [facility]

al

 

                                        Patient discharged. 

 

                Outpatient      Attender: Marah Bryant MD ED-IMAGH        0

2021 09:39:00 AM EDT - 

2021 09:40:00 AM EDT PAIN IN RT KNEE           [facility]

 

                                        PAIN IN RT KNEE 

 

                                        Patient discharged. 

 

                Outpatient      Attender: Marah Bryant MD ED-IMAG        0

2021 10:41:00 AM EDT - 

2021 10:42:00 AM EDT [id number]                    [facility]

 

                                        [id number] 

 

                                        Patient discharged. 

 

                Outpatient      Attender: Marah Bryant MD ED-HCCDEKPCP    0

2021 10:16:00 AM 

EDT - 2021 10:17:00 AM EDT                           [facility]

 

                                        Patient discharged. 

 

                Outpatient      Attender: Marah Bryant MD ED-HCCDEKPCP    0

3/30/2021 10:05:00 AM 

EDT - 2021 10:06:00 AM EDT                           [facility]

 

                                        Patient discharged. 

 

                Outpatient      Attender: Marah Bryant MD ED-HCCDEKPCP    0

3/23/2021 09:49:00 AM 

EDT - 2021 09:50:00 AM EDT                           [facility]

 

                                        Patient discharged. 

 

                Outpatient      Attender: Marah Bryant MD ED-HCCDEKPCP    1

2020 09:47:00 AM 

EST - 2020 09:48:00 AM [facility]

 

                                        Patient discharged. 

 

                    Outpatient          Attender: Marah Bryant MDAttender: 

Marah Bryant MD 

ED-HCCDEKPCP        10/14/2020 09:39:00 AM EDT - 10/14/2020 09:40:00 AM EDT     

                

[facility]

 

                                        Patient discharged. 



                                                                                
                                                                                
                          



Immunizations

          



             Vaccine      Date         Status       Description  Data Source(s)

 

             COVID-19 VACCINE Moderna 2021 12:00:00 AM EST completed      

           [facility]

 

                                        Vaccine Series Complete: YESThis Data wa

s Submitted to [facility]. 

 

             COVID-19 VACCINE Moderna 2021 12:00:00 AM EDT completed      

           [facility]

 

                                        Vaccine Series Complete: YESThis Data wa

s Submitted to [facility]. 

 

             COVID-19 VACC,MRNA(MODERNA)/PF 2021 12:00:00 AM EDT completed

                 [facility]

 

                    COVID-19 VACCINE, MRNA-1273, LNP-S (MODERNA)/PF 2021 1

2:00:00 AM EDT 

completed                                           [facility]

 

             COVID-19 VACCINE Moderna 2021 12:00:00 AM EDT completed      

           [facility]

 

                                        Vaccine Series Complete: NOThis Data was

 Submitted to [facility]. 



                                                                                
                                               



Medications

          



          Medication Brand Name Start Date Product Form Dose      Route     Admi

nistrative 

Instructions Pharmacy Instructions Status     Indications Reaction   Description

 Data 

Source(s)

 

          100 mcg/0.5 mL           2021 12:00:00 AM EST suspension 0      

             INJECT AS DIRECTED 

(THIRD DOSE) INJECT AS DIRECTED (THIRD DOSE) SOLD: 2021                   

               [facility]

 

                                        POLYETHYLENE GLYCOL 3350 704960 MG / Pot

assium Chloride 2970 MG / Sodium 

Bicarbonate 6740 MG / Sodium Chloride 5860 MG / sodium sulfate 07508 MG Powder 
for Oral Solution [Gavilyte-G] 236-22.74-6.74 -5.86 gram BRO1858/SOD 

SULF,BICARB,CL/KCL 10/20/2021 12:00:00 AM EDT recon soln   4000                 

     TAKE AS DIRECTED

BY DR MEJIA TAKE AS DIRECTED BY DR MEJIA SOLD: 2021                   

               [facility]

 

                montelukast 10 MG Oral Tablet MONTELUKAST SODIUM 10/13/2021 12:0

0:00 AM EDT 

tablet          90                              TAKE ONE TABLET BY MOUTH EVERY D

AY TAKE ONE TABLET BY MOUTH EVERY 

DAY          SOLD: 10/14/2021                                        [facility]

s

 

           tizanidine 4 MG Oral Tablet TIZANIDINE HCL 10/13/2021 12:00:00 AM EDT

 tablet     90         

                    TAKE ONE TABLET BY MOUTH EVERY DAY TAKE ONE TABLET BY MOUTH 

EVERY DAY SOLD: 

10/14/2021                                                      [facility]

 

          5 mg                10/13/2021 12:00:00 AM EDT tablet    90           

       TAKE ONE TABLET BY MOUTH EVERY DAY

           TAKE ONE TABLET BY MOUTH EVERY DAY SOLD: 10/14/2021                  

                [facility]

 

                                        30 ACTUAT fluticasone furoate 0.1 MG/ACT

UAT / vilanterol 0.025 MG/ACTUAT Dry 

Powder Inhaler [Breo] 100-25 mcg/dose FLUTICASONE/VILANTEROL    10/13/2021 12:00

:00

 AM EDT      blister with device 180                       INHALE 1 PUFF BY MOUT

H ONCE DAILY INHALE 1 

PUFF BY MOUTH ONCE DAILY SOLD: 10/14/2021                                       

 [facility]

 

          500 mg              10/13/2021 12:00:00 AM EDT tablet    180          

       TAKE TWO TABLETS BY MOUTH EVERY

 DAY       TAKE TWO TABLETS BY MOUTH EVERY DAY SOLD: 10/14/2021                 

                 [facility]

 

          40 mg               10/13/2021 12:00:00 AM EDT tablet    90           

       TAKE ONE TABLET BY MOUTH EVERY 

DAY        TAKE ONE TABLET BY MOUTH EVERY DAY SOLD: 10/14/2021                  

                [facility]

 

           Cephalexin 500 MG Oral Capsule CEPHALEXIN 2021 12:00:00 AM EDT 

capsule    28         

                          TAKE ONE CAPSULE BY MOUTH FOUR TIMES A DAY FOR 7 DAYS 

TAKE ONE CAPSULE BY 

MOUTH FOUR TIMES A DAY FOR 7 DAYS SOLD: 2021                              

          [facility]

 

          600 mg              2021 12:00:00 AM EDT tablet    14           

       TAKE ONE TABLET BY MOUTH TWICE A

 DAY       TAKE ONE TABLET BY MOUTH TWICE A DAY SOLD: 2021                

                  [facility]

 

                    doxycycline hyclate 100 MG Oral Tablet DOXYCYCLINE HYCLATE 0

3/30/2021 12:00:00 

AM EDT       tablet       20                        TAKE ONE TABLET BY MOUTH TWI

CE A DAY FOR 10 DAYS TAKE ONE 

TABLET BY MOUTH TWICE A DAY FOR 10 DAYS SOLD: 2021                        

                [facility]

 

          90 mcg/actuation           2021 12:00:00 AM EDT HFA aerosol inha

ler 54                  INHALE 

TWO PUFFS BY MOUTH EVERY 4 HOURS AS NEEDED INHALE TWO PUFFS BY MOUTH EVERY 4 

HOURS AS NEEDED SOLD: 2021                                        [facility]

 

          40 mg               2020 12:00:00 AM EDT tablet    90           

       TAKE ONE TABLET BY MOUTH EVERY 

DAY        TAKE ONE TABLET BY MOUTH EVERY DAY SOLD: 2020                  

                [facility]

 

          40 mg               2020 12:00:00 AM EDT tablet    90           

       TAKE ONE TABLET BY MOUTH EVERY 

DAY        TAKE ONE TABLET BY MOUTH EVERY DAY SOLD: 2021                  

                [facility]

 

          40 mg               2020 12:00:00 AM EDT tablet    90           

       TAKE ONE TABLET BY MOUTH EVERY 

DAY        TAKE ONE TABLET BY MOUTH EVERY DAY SOLD: 2021                  

                [facility]

 

          300 mg              2020 12:00:00 AM EDT tablet    90           

       TAKE ONE TABLET BY MOUTH EVERY 

DAY        TAKE ONE TABLET BY MOUTH EVERY DAY SOLD: 2021                  

                [facility]

 

           tizanidine 4 MG Oral Tablet TIZANIDINE HCL 2020 12:00:00 AM EDT

 tablet     90         

                    TAKE ONE TABLET BY MOUTH EVERY DAY TAKE ONE TABLET BY MOUTH 

EVERY DAY SOLD: 

2021                                                      [facility]

 

                atorvastatin 20 MG Oral Tablet ATORVASTATIN CALCIUM 2020 1

2:00:00 AM EDT 

tablet       90                        TAKE 1 TABLET BY MOUTH ONCE DAILY TAKE 1 

TABLET BY MOUTH ONCE DAILY 

SOLD: 2021                                                 [facility]

 

           tizanidine 4 MG Oral Tablet TIZANIDINE HCL 2020 12:00:00 AM EDT

 tablet     90         

                    TAKE ONE TABLET BY MOUTH EVERY DAY TAKE ONE TABLET BY MOUTH 

EVERY DAY SOLD: 

2020                                                      [facility]

 

          300 mg              2020 12:00:00 AM EDT tablet    90           

       TAKE ONE TABLET BY MOUTH EVERY 

DAY        TAKE ONE TABLET BY MOUTH EVERY DAY SOLD: 2020                  

                [facility]

 

           tizanidine 4 MG Oral Tablet TIZANIDINE HCL 2020 12:00:00 AM EDT

 tablet     90         

                    TAKE ONE TABLET BY MOUTH EVERY DAY TAKE ONE TABLET BY MOUTH 

EVERY DAY SOLD: 

2021                                                      [facility]

 

                atorvastatin 20 MG Oral Tablet ATORVASTATIN CALCIUM 2020 1

2:00:00 AM EDT 

tablet       90                        TAKE 1 TABLET BY MOUTH ONCE DAILY TAKE 1 

TABLET BY MOUTH ONCE DAILY 

SOLD: 2021                                                 [facility]

 

          300 mg              2020 12:00:00 AM EDT tablet    90           

       TAKE ONE TABLET BY MOUTH EVERY 

DAY        TAKE ONE TABLET BY MOUTH EVERY DAY SOLD: 2021                  

                [facility]

 

                montelukast 10 MG Oral Tablet MONTELUKAST SODIUM 2020 12:0

0:00 AM EDT 

tablet          90                              TAKE ONE TABLET BY MOUTH EVERY D

AY TAKE ONE TABLET BY MOUTH EVERY 

DAY          SOLD: 2021                                        [facility]

s

 

          100-25 mcg/dose           2020 12:00:00 AM EDT blister with neo

ce 180                 INHALE 

ONE PUFF BY MOUTH ONCE DAILY INHALE ONE PUFF BY MOUTH ONCE DAILY SOLD: 

2021                                                      [facility]

 

          5 mg                2020 12:00:00 AM EDT tablet    90           

       TAKE ONE TABLET BY MOUTH EVERY DAY

           TAKE ONE TABLET BY MOUTH EVERY DAY SOLD: 2020                  

                [facility]

 

                                        30 ACTUAT fluticasone furoate 0.1 MG/ACT

UAT / vilanterol 0.025 MG/ACTUAT Dry 

Powder Inhaler [Breo] 100-25 mcg/dose FLUTICASONE/VILANTEROL    2020 12:00

:00

 AM EDT      blister with device 180                       INHALE ONE PUFF BY MO

UTH ONCE DAILY INHALE ONE

 PUFF BY MOUTH ONCE DAILY SOLD: 2021                                      

  [facility]

 

          5 mg                2020 12:00:00 AM EDT tablet    90           

       TAKE ONE TABLET BY MOUTH EVERY DAY

           TAKE ONE TABLET BY MOUTH EVERY DAY SOLD: 2021                  

                [facility]

 

          500 mg              2020 12:00:00 AM EDT tablet    180          

       TAKE ONE TABLET BY MOUTH TWICE 

A DAY      TAKE ONE TABLET BY MOUTH TWICE A DAY SOLD: 2021                

                  [facility]

 

          100-25 mcg/dose           2020 12:00:00 AM EDT blister with neo

ce 180                 INHALE 

ONE PUFF BY MOUTH ONCE DAILY INHALE ONE PUFF BY MOUTH ONCE DAILY SOLD: 

2020                                                      [facility]

 

          5 mg                2020 12:00:00 AM EDT tablet    90           

       TAKE ONE TABLET BY MOUTH EVERY DAY

           TAKE ONE TABLET BY MOUTH EVERY DAY SOLD: 2021                  

                [facility]

 

                montelukast 10 MG Oral Tablet MONTELUKAST SODIUM 2020 12:0

0:00 AM EDT 

tablet          90                              TAKE ONE TABLET BY MOUTH EVERY D

AY TAKE ONE TABLET BY MOUTH EVERY 

DAY          SOLD: 2020                                        [facility]

s

 

                montelukast 10 MG Oral Tablet MONTELUKAST SODIUM 2020 12:0

0:00 AM EDT 

tablet          90                              TAKE ONE TABLET BY MOUTH EVERY D

AY TAKE ONE TABLET BY MOUTH EVERY 

DAY          SOLD: 2021                                        [facility]

s

 

          500 mg              2020 12:00:00 AM EDT tablet    180          

       TAKE ONE TABLET BY MOUTH TWICE 

A DAY      TAKE ONE TABLET BY MOUTH TWICE A DAY SOLD: 2020                

                  [facility]



                                                                                
                                                                                
                                                                                
                                                                                
                                                                              



Insurance Providers

          



             Payer name   Policy type / Coverage type Policy ID    Covered party

 ID Covered 

party's relationship to herrera Policy Herrera             Plan Information

 

          [employer]    H         [id number]           Self                [id number]

 

          [facility]       I         [id number]           Self                [id number]

 

          MEDICAID            [id number]            S                   [id number]

 

          MEDICARE            [id number]           S                   [id number]

[id number]

 

          MEDICAID            [id number]            S                   [id number]

 

          MEDICARE            [id number]           S                   [id number]

[id number]

 

          EMEDNY              [id number]            SP                  [id number]

 

          [facility] [employer] [facility]           [id number]           SP           

       [id number]

 

          MEDICAID            [id number]                                [id number]

 

          MEDICARE  C         [id number] S                   [id number]

[id number]

 

          [facility]) O         [id number] S              

     [id number]

 

          [facility]           [id number]           SP             

     [id number]

 

          [facility]           [id number]           SP             

     [id number]

 

          MEDICARE            [id number]           SP                  [id number]

A75

 

          MEDICARE            [id number]           SP                  [id number]

A

 

          [facility]           [id number]           S                   [id number]

[id number]

 

          [employer] MEDICAID MCD HMO   [id number]           S            

       [id number]

 

          [facility] PLAN           [id number]           [id number]          

        [id number]

 

          [employer]             [id number]                  [id number]

[id number]

 

          [facility] MEDICAID           [id number]            SP                  [id number]

C

 

                              [id number]                                [id number]

 

          MEDICARE            [id number]           SP                  [id number]

[id number]



                                                                                
                                                                                
                                                                                
                                                          



Problems, Conditions, and Diagnoses

          



           Code       Display Name Description Problem Type Effective Dates Data

 Source(s)

 

                          Z85.048                   Personal history of other ma

lignant neoplasm of rectum, rectosigmoid 

junction, and anus        PRSNL HX OF MALIG NEOPLM OF RECTUM, RECTOSIG JUNCT, AN

D ANUS 

Diagnosis                 2021 11:12:00 AM [facility]

 

             Z23          Encounter for immunization ENCOUNTER FOR IMMUNIZATION 

Diagnosis    2021 

11:12:00 AM [facility]

 

                    G47.33              Obstructive sleep apnea (adult) (pediatr

ic) OBSTRUCTIVE SLEEP APNEA 

(ADULT) (PEDIATRIC) Diagnosis           2021 11:12:00 AM [facility]

tung

 

                          S83.511D                  Sprain of anterior cruciate 

ligament of right knee, subsequent 

encounter           SPRAIN OF ANTERIOR CRUCIATE LIGAMENT OF RIGHT KNEE, SUBS Maddy

gnosis           

2021 11:12:00 AM [facility]

 

             E78.5        Hyperlipidemia, unspecified HYPERLIPIDEMIA, UNSPECIFIE

D Diagnosis    

2021 11:12:00 AM [facility]

 

             I10          Essential (primary) hypertension ESSENTIAL (PRIMARY) H

YPERTENSION Diagnosis    

2021 11:12:00 AM [facility]

 

                    J44.9               Chronic obstructive pulmonary disease, u

nspecified CHRONIC OBSTRUCTIVE 

PULMONARY DISEASE, UNSPECIFIED Diagnosis           2021 11:12:00 AM [facility]

 

                          Z00.00                    Encounter for general adult 

medical examination without abnormal findings

                    ENCNTR FOR GENERAL ADULT MEDICAL EXAM W/O ABNORMAL FINDINGS 

Diagnosis           

2021 11:12:00 AM [facility]

 

             L03.115      Cellulitis of right lower limb CELLULITIS OF RIGHT LOW

ER LIMB Diagnosis    

2021 12:49:00 PM [facility]

 

                Y92.9           Unspecified place or not applicable UNSPECIFIED 

PLACE OR NOT APPLICABLE 

Diagnosis                 2021 01:42:00 PM [facility]

 

                    X58.XXXA            Exposure to other specified factors, ini

tial encounter EXPOSURE TO 

OTHER SPECIFIED FACTORS, INITIAL ENCOUNTER Diagnosis           2021 01:42:

00 PM [facility]

 

                          M23.203                   Derangement of unspecified m

edial meniscus due to old tear or injury, 

right knee          DERANG OF UNSP MEDIAL MENISCUS DUE TO OLD TEAR/INJ, R KNEE D

iagnosis           

2021 01:42:00 PM [facility]

 

                    M17.31              Unilateral post-traumatic osteoarthritis

, right knee UNILATERAL POST-

TRAUMATIC OSTEOARTHRITIS, RIGHT KNEE Diagnosis           2021 01:42:00 PM 

[facility]

 

                    S83.521A            Sprain of posterior cruciate ligament of

 right knee, initial encounter 

SPRAIN OF POSTERIOR CRUCIATE LIGAMENT OF RIGHT KNEE, INIT Diagnosis             

    2021 

01:42:00 PM [facility]

 

                    S83.511A            Sprain of anterior cruciate ligament of 

right knee, initial encounter 

SPRAIN OF ANTERIOR CRUCIATE LIGAMENT OF RIGHT KNEE, INIT Diagnosis              

   2021 

01:42:00 PM [facility]

 

                    R91.8               Other nonspecific abnormal finding of armando

ng field OTHER NONSPECIFIC 

ABNORMAL FINDING OF LUNG FIELD Diagnosis           10/14/2020 09:39:00 AM [facility]



                                                                                
                                                                                
                                                                                
        



Surgeries/Procedures

          



             Procedure    Description  Date         Indications  Data Source(s)

 

                          Hospital outpatient clinic visit for assessment and ma

nagement of a patient 

Hospital Outpatient Clinic Visit 2021 12:00:00 AM [facility]

 

                    COLLECTION VENOUS BLOOD VENIPUNCTURE ROUTINE VENIPUNCTURE  12:00:00 AM

 [facility]

 

             Administration of pneumococcal vaccine              2021 12:0

0:00 AM [facility]

 

             Administration of influenza virus vaccine              2021 1

2:00:00 AM [facility]

 

                    BLOOD COUNT COMPLETE AUTO&AUTO DIFRNTL WBC COUNT COMPLETE CB

C W/AUTO DIFF WBC 

2021 12:00:00 AM [facility]

 

             LIPID PANEL  LIPID PANEL  2021 12:00:00 AM [facility]

 

                    COMPREHENSIVE METABOLIC PANEL COMPREHEN METABOLIC PANEL 09/3

 12:00:00 AM 

[facility]

 

                    INFLUENZA VACCINE SPLT PRSRV FREE INC ANTIGEN IM IIV NO PRSV

 INCREASED AG IM 

2021 12:00:00 AM [facility]

 

                    PNEUMOCOCCAL CONJ VACCINE 13 VALENT IM PCV13 VACCINE IM    0

2021 12:00:00 AM 

[facility] outpatient clinic visit for assessment and ma

nagement of a patient 

Hospital Outpatient Clinic Visit 2021 12:00:00 AM [facility]

 

             PROTHROMBIN TIME PROTHROMBIN TIME 10/14/2020 12:00:00 AM [facility]

 

                    BASIC METABOLIC PANEL CALCIUM TOTAL METABOLIC PANEL TOTAL CA

 10/14/2020 12:00:00

 AM [facility]



                                                                                
                                                                                
                                      



Results

          



                    ID                  Date                Data Source

 

                    [id number] 2021 03:57:00 PM [facility]









          Name      Value     Range     Interpretation Code Description Data Lamar

rce(s) Supporting 

Document(s)

 

           Sodium     141 mmol/L 136-145    Normal (applies to non-numeric resul

ts)            [facility]                                 

 

           Potassium             3.5-5.1    Normal (applies to non-numeric resul

ts)            [facility]                                 

 

           Chloride   103 mmol/L      Normal (applies to non-numeric resul

ts)            [facility]                                 

 

          Carbon Dioxide CO2           21-32     Above high normal           [facility]  

 

           Anion Gap             5.0-16.0   Normal (applies to non-numeric resul

ts)            [facility]                                 

 

          BUN       15 mg/dL  7-18      Normal (applies to non-numeric results) 

          [facility] 

 

 

          Creatinine,Serum           0.8-1.5   Below low normal           [facility]  

 

          GFR                 >60       Normal (applies to non-numeric results) 

          [facility]  

 

           Glucose Level 82 mg/dL   60-99      Normal (applies to non-numeric re

sults)            

[facility]                      

 

                                        Reference range is only applicable when 

patient is fasting    Note the following

 drug interference:    Sulfasalazine                  Sulfapyridine  Can see 
falsely depressed      Can see falsely elevated  result with up to 17%          
results with up to 11%  decrease in measurement        increase in measurement  
  Recommend patients be collected for this test prior to administration of 
either drug. 

 

           Calcium               8.5-10.1   Normal (applies to non-numeric resul

ts)            [facility]

                                         

 

           Bilirubin,Total            0.1-1.9    Normal (applies to non-numeric 

results)            [facility]                                 

 

          SGOT(AST) 13 U/L    15-37     Below low normal           [facility]

tung  

 

                                        Note the following drug interference:   

 Sulfasalazine                  

Sulfapyridine  Can see falsely depressed      Can see falsely elevated  result 
with up to 10%          results with up to 10%  decrease in measurement        
increase in measurement    Recommend patients be collected for this test prior 
to administration of either drug. 

 

           SGPT(ALT)  18 U/L     12-78      Normal (applies to non-numeric resul

ts)            [facility]                                 

 

                                        Note the following drug interference:   

 Sulfasalazine                  

Sulfapyridine  Can see falsely depressed      Can see falsely elevated  result 
with up to 29%          results with up to 10%  decrease in measurement        
increase in measurement    Recommend patients be collected for this test prior 
to administration of either drug. 

 

           Alkaline Phosphatase 91 U/L          Normal (applies to non-num

anne results)            

[facility]                      

 

                                        Pregnancy can increase Alkaline Phosp le

vels up to 2 times the normal adult 

value.      Normal values for children and adolescents are 2 to 3 times the 
normal adult value. 

 

           Total Protein            6.0-8.2    Normal (applies to non-numeric re

sults)            [facility]                                 

 

           Albumin Level            3.4-5.0    Normal (applies to non-numeric re

sults)            [facility]                                 









                    ID                  Date                Data Source

 

                    [id number] 2021 03:57:00 PM EDT [facility]









          Name      Value     Range     Interpretation Code Description Data Lamar

rce(s) Supporting 

Document(s)

 

           Triglycerides 38 mg/dL   <150       Normal (applies to non-numeric re

sults)            [facility]                                

 

           Cholesterol 119 mg/dL  100-200    Normal (applies to non-numeric resu

lts)            

[facility]                      

 

           LDL Cholesterol Calculated 77         0-130      Normal (applies to n

on-numeric results)            

[facility]                      

 

          HDL Cholesterol 34 mg/dL  40-60     Below low normal           [facility]  

 

          Cholesterol/HDL Ratio           3.6-6.7   Below low normal           [facility]  









                    ID                  Date                Data Source

 

                    [id number] 2021 03:40:00 PM EDT [facility]









          Name      Value     Range     Interpretation Code Description Data Lamar

rce(s) Supporting 

Document(s)

 

           White Blood Count            3.5-10.5   Normal (applies to non-numeri

c results)            

[facility]                      

 

           Red Blood Count            4.30-5.70  Normal (applies to non-numeric 

results)            [facility]                                

 

           Hemoglobin            13.5-17.5  Normal (applies to non-numeric resul

ts)            [facility]                                 

 

           Hematocrit            38.8-50.0  Normal (applies to non-numeric resul

ts)            [facility]                                 

 

          Mean Corpuscular Volume           81.2-95.1 Above high normal         

  [facility]  

 

           Mean Corpuscular Hgb            25.6-32.2  Normal (applies to non-num

anne results)            

[facility]                      

 

          Mean Corpuscular Hgb Conc           32.0-36.0 Below low normal        

   [facility]  

 

             Red Cell Distribution Width              11.8-15.6    Normal (appli

es to non-numeric results) 

                          [facility]        

 

           Platelet Count 155 x10 3/uL 150-450    Normal (applies to non-numeric

 results)            

[facility]                      

 

           Mean Platelet Volume            9.4-12.4   Normal (applies to non-num

anne results)            

[facility]                      

 

          Neutrophils% (Auto)           31.0-71.0 Above high normal           [facility]  

 

          Lymphocytes% (Auto)           20.0-55.0 Below low normal           [facility]  

 

           Monocytes% (Auto)            4.0-12.0   Normal (applies to non-numeri

c results)            

[facility]                      

 

           Eosinophils% (Auto)            1.0-8.0    Normal (applies to non-nume

baldev results)            

[facility]                      

 

           Basophils% (Auto)            0.0-2.0    Normal (applies to non-numeri

c results)            [facility]                                

 

             Immature Granulocytes% (Auto)              0.0-2.0      Normal (karley

lies to non-numeric results) 

                          [facility]        

 

           Neutrophils# (Auto)            1.50-8.50  Normal (applies to non-nume

baldev results)            

[facility]                      

 

          Lymphocytes# (Auto)           1.20-4.00 Below low normal           [facility]  

 

           Monocytes# (Auto)            0.00-0.90  Normal (applies to non-numeri

c results)            

[facility]                      

 

           Eosinophils# (Auto)            0.00-0.50  Normal (applies to non-nume

baldev results)            

[facility]                      

 

           Basophils# (Auto)            0.00-0.20  Normal (applies to non-numeri

c results)            

[facility]                      

 

                Immature Granulocytes# (Auto)                 0.00-7.00       No

rmal (applies to non-numeric 

results)                                [facility]  









                    ID                  Date                Data Source

 

                    [id number]          2021 10:31:00 AM EDT [facility]   Imaging Services 

Department   Imaging Report   [address], [place] [address]   [phone]       
%(RAD)RES..mtdd.print.filter("line")   Name: SUKHWINDER ESTRADA   : 1955 
Age/Sex: 65M   Ordering Provider: Marah Bryant MD  Med Rec #: [id number]
  Account #: [id number]  Reg Status: DEP REF Room #:   Date of Service: 21 
  Report Number: [id number]  
________________________________________________________________________________

_________  cc:Marah Bryant MD  Send Report To:     [id number] MRI/MRI 
Knee Rt No Contrast    Reason for exam: PAIN IN RT KNEE    Comparison is made to
 x-ray 2021    FINDINGS:  The ACL is torn.  There is partial tear of the 
PCL.  The medial and lateral collateral ligaments are intact.  Quadriceps and 
patellar tendon unremarkable.  Joint effusion.  Medial meniscal tear posterior 
horn.  Lateral meniscal tear posterior horn.  No abnormal marrow signal.      
IMPRESSION:   Torn ACL.  Partial tear PCL.  Medial meniscal tear posterior horn.
 Lateral meniscal tear posterior horn.  Joint effusion.       Time portable 
performed:   Fluoroscopy time in seconds:   Number of Exposures:    Contrast 
Agent in ml:          Method of Administration:                                 
                    **REPORT SIGNATURE ON FILE**   Reported By: Vivek Domingo DO 
  <Electronically signed by Vivek Domingo DO> 21 1327     Dictation 
Date/Time: 21 1401  Transcribed Date/Time: 21 1031  
: HIM.DORAP 









          Name      Value     Range     Interpretation Code Description Data Lamar

rce(s) Supporting 

Document(s)

 

                                                                       









                    ID                  Date                Data Source

 

                    [id number]          2021 12:59:00 PM EDT [facility]   Imaging Services 

Department   Imaging Report   [address], [place] [address]   [phone]       
%(RAD)RES..mtdd.print.filter("line")   Name: SUKHWINDER ESTRADA   : 1955 
Age/Sex: 65M   Ordering Provider: Marah Bryant MD  Med Rec #: [id number]
  Account #: [id number]  Reg Status: [facility] REF Room #:   Date of Service: 21 
  Report Number: [id number]  
________________________________________________________________________________

_________  cc:Marah Bryant MD  Send Report To:     [id number] XRP/XR 
Knee Rt Min. 4 Views    Reason for exam: RT KNEE PAIN    FINDINGS: No fracture 
or dislocation is identified.  There is moderate to severejoint space narrowing 
at the medial compartment of the knee and moderate at the lateral compartment.  
There is mild joint space narrowing at the patellofemoral joint.  There is 
moderate spurring at the medial and lateral compartments of theknee and mild to 
moderate at the patella.  No suspicious osseous lesion is seen. There is no 
joint effusion.    IMPRESSION:  NO FRACTURE/DISLOCATION.  MODERATE DJD.        
Time portable performed:   Fluoroscopy time in seconds:   Number of Exposures:  
  Contrast Agent in ml:          Method of Administration:                      
                               **REPORT SIGNATURE ON FILE**   Reported By: 
Clement Sanchez MD   <Electronically signed by Clement Sanchez MD> 21
 1153     Dictation Date/Time: 21 1052  Transcribed Date/Time: 21 
1259  : HIM.[email or web address] 









          Name      Value     Range     Interpretation Code Description Data Lamar

rce(s) Supporting 

Document(s)

 

                                                                       









                    ID                  Date                Data Source

 

                    [id number]             2021 10:51:00 AM [facility]









          Name      Value     Range     Interpretation Code Description Data Lamar

rce(s) Supporting 

Document(s)

 

          SARS-CoV-2 (COVID 19) NEGATIVE - SARS-CoV-2 (COVID19)                 

              [facility]     

 

                                        This lab was ordered by [facility] a

nd reported by [facility].

 









                    ID                  Date                Data Source

 

                    [id number] 10/14/2020 04:28:00 PM EDT [facility]









          Name      Value     Range     Interpretation Code Description Data Lamar

rce(s) Supporting 

Document(s)

 

          PT                  9.2-11.7  Normal (applies to non-numeric results) 

          [facility]  

 

          INR                           Normal (applies to non-numeric results) 

          [facility]  

 

                                        The use of INR is restricted to patients

 on stable oral anticoagulant.    

Therapeutic Range:  2.0 - 3.0    High Risk Range:  2.5 - 3.5 









                    ID                  Date                Data Source

 

                    [id number] 10/14/2020 04:02:00 PM EDT [facility]









          Name      Value     Range     Interpretation Code Description Data Lamar

rce(s) Supporting 

Document(s)

 

           Sodium     137 mmol/L 136-145    Normal (applies to non-numeric resul

ts)            [facility]                                 

 

           Potassium             3.5-5.1    Normal (applies to non-numeric resul

ts)            [facility]                                 

 

           Chloride   98 mmol/L       Normal (applies to non-numeric resul

ts)            [facility]                                 

 

          Carbon Dioxide CO2           21-32     Above high normal           [facility]  

 

           Anion Gap             5.0-16.0   Normal (applies to non-numeric resul

ts)            [facility]                                 

 

          BUN       14 mg/dL  7-18      Normal (applies to non-numeric results) 

          [facility] 

 

 

           Creatinine,Serum            0.8-1.5    Normal (applies to non-numeric

 results)            [facility]                                 

 

          GFR                 >60       Normal (applies to non-numeric results) 

          [facility]  

 

           Glucose Level 93 mg/dL   60-99      Normal (applies to non-numeric re

sults)            

[facility]                      

 

                                        Reference range is only applicable when 

patient is fasting    Note the following

 drug interference:    Sulfasalazine                  Sulfapyridine  Can see 
falsely depressed      Can see falsely elevated  result with up to 17%          
results with up to 11%  decrease in measurement        increase in measurement  
  Recommend patients be collected for this test prior to administration of 
either drug. 

 

           Calcium               8.5-10.1   Normal (applies to non-numeric resul

ts)            [facility]

                                         









                    ID                  Date                Data Source

 

                    [id number] 10/14/2020 04:01:00 PM EDT [facility]









          Name      Value     Range     Interpretation Code Description Data Lamar

rce(s) Supporting 

Document(s)

 

           White Blood Count            3.5-10.5   Normal (applies to non-numeri

c results)            

[facility]                      

 

           Red Blood Count            4.30-5.70  Normal (applies to non-numeric 

results)            [facility]                                

 

           Hemoglobin            13.5-17.5  Normal (applies to non-numeric resul

ts)            [facility]                                 

 

           Hematocrit            38.8-50.0  Normal (applies to non-numeric resul

ts)            [facility]                                 

 

          Mean Corpuscular Volume           81.2-95.1 Above high normal         

  [facility]  

 

           Mean Corpuscular Hgb            25.6-32.2  Normal (applies to non-num

anne results)            

[facility]                      

 

          Mean Corpuscular Hgb Conc           32.0-36.0 Below low normal        

   [facility]  

 

             Red Cell Distribution Width              11.8-15.6    Normal (appli

es to non-numeric results) 

                          [facility]        

 

           Platelet Count 173 x10 3/uL 150-450    Normal (applies to non-numeric

 results)            

[facility]                      

 

           Mean Platelet Volume            9.4-12.4   Normal (applies to non-num

anne results)            

[facility]                      

 

          Neutrophils% (Auto)           31.0-71.0 Above high normal           [facility]  

 

          Lymphocytes% (Auto)           20.0-55.0 Below low normal           [facility]  

 

           Monocytes% (Auto)            4.0-12.0   Normal (applies to non-numeri

c results)            

[facility]                      

 

           Eosinophils% (Auto)            1.0-8.0    Normal (applies to non-nume

baldev results)            

[facility]                      

 

           Basophils% (Auto)            0.0-2.0    Normal (applies to non-numeri

c results)            [facility]                                

 

             Immature Granulocytes% (Auto)              0.0-2.0      Normal (karley

lies to non-numeric results) 

                          [facility]        

 

          Neutrophils# (Auto)           1.50-6.20 Above high normal           [facility]  

 

           Lymphocytes# (Auto)            1.20-4.00  Normal (applies to non-nume

baldev results)            

[facility]                      

 

           Monocytes# (Auto)            0.00-0.90  Normal (applies to non-numeri

c results)            

[facility]                      

 

           Eosinophils# (Auto)            0.00-0.50  Normal (applies to non-nume

baldev results)            

[facility]                      

 

           Basophils# (Auto)            0.00-0.20  Normal (applies to non-numeri

c results)            

[facility]                      

 

                Immature Granulocytes# (Auto)                 0.00-7.00       No

rmal (applies to non-numeric 

results)                                [facility]  







                                        Procedure

 

                                          



                                                                                
                                                                                
                  



Social History

          No Information

## 2021-12-10 NOTE — ECGEPIP
[facility] - ED

                                       

                                       Test Date:    2021-12-10

Pat Name:     SUKHWINDER ESTRADA            Department:   

Patient ID:   [id number]                 Room:         -

Gender:       Male                     Technician:   ALEXSHLOMO

:          1955               Requested By: Maja Lundborg-Gray 

Order Number: [id number]     Reading MD:   Yessenia Holcomb

                                 Measurements

Intervals                              Axis          

Rate:         62                       P:            37

VA:           184                      QRS:          -58

QRSD:         140                      T:            -13

QT:           428                                    

QTc:          434                                    

                           Interpretive Statements

Normal sinus rhythm

Left axis deviation

Right bundle branch block

Inferior infarct , age undetermined

decreased rate 19

Electronically Signed on 12- 19:02:48 EST by Yessenia Holcomb

## 2022-01-19 ENCOUNTER — HOSPITAL ENCOUNTER (OUTPATIENT)
Dept: HOSPITAL 53 - M SDC | Age: 67
Discharge: HOME | End: 2022-01-19
Attending: STUDENT IN AN ORGANIZED HEALTH CARE EDUCATION/TRAINING PROGRAM
Payer: MEDICARE

## 2022-01-19 VITALS — BODY MASS INDEX: 38.52 KG/M2 | WEIGHT: 284.4 LBS | HEIGHT: 72 IN

## 2022-01-19 VITALS — SYSTOLIC BLOOD PRESSURE: 142 MMHG | DIASTOLIC BLOOD PRESSURE: 90 MMHG

## 2022-01-19 DIAGNOSIS — Z79.899: ICD-10-CM

## 2022-01-19 DIAGNOSIS — Z85.048: ICD-10-CM

## 2022-01-19 DIAGNOSIS — E78.00: ICD-10-CM

## 2022-01-19 DIAGNOSIS — Z92.21: ICD-10-CM

## 2022-01-19 DIAGNOSIS — Z79.51: ICD-10-CM

## 2022-01-19 DIAGNOSIS — Z87.891: ICD-10-CM

## 2022-01-19 DIAGNOSIS — Z79.1: ICD-10-CM

## 2022-01-19 DIAGNOSIS — C34.31: Primary | ICD-10-CM

## 2022-01-19 DIAGNOSIS — G47.30: ICD-10-CM

## 2022-01-19 DIAGNOSIS — Z92.3: ICD-10-CM

## 2022-01-19 DIAGNOSIS — R06.83: ICD-10-CM

## 2022-01-19 DIAGNOSIS — J44.9: ICD-10-CM

## 2022-01-19 DIAGNOSIS — I10: ICD-10-CM

## 2022-01-19 PROCEDURE — 88342 IMHCHEM/IMCYTCHM 1ST ANTB: CPT

## 2022-01-19 PROCEDURE — 87102 FUNGUS ISOLATION CULTURE: CPT

## 2022-01-19 PROCEDURE — 88313 SPECIAL STAINS GROUP 2: CPT

## 2022-01-19 PROCEDURE — 76000 FLUOROSCOPY <1 HR PHYS/QHP: CPT

## 2022-01-19 PROCEDURE — 87077 CULTURE AEROBIC IDENTIFY: CPT

## 2022-01-19 PROCEDURE — 31628 BRONCHOSCOPY/LUNG BX EACH: CPT

## 2022-01-19 PROCEDURE — 88173 CYTOPATH EVAL FNA REPORT: CPT

## 2022-01-19 PROCEDURE — 87206 SMEAR FLUORESCENT/ACID STAI: CPT

## 2022-01-19 PROCEDURE — 31627 NAVIGATIONAL BRONCHOSCOPY: CPT

## 2022-01-19 PROCEDURE — 31629 BRONCHOSCOPY/NEEDLE BX EACH: CPT

## 2022-01-19 PROCEDURE — 87116 MYCOBACTERIA CULTURE: CPT

## 2022-01-19 PROCEDURE — 88305 TISSUE EXAM BY PATHOLOGIST: CPT

## 2022-01-19 PROCEDURE — 31624 DX BRONCHOSCOPE/LAVAGE: CPT

## 2022-01-19 PROCEDURE — 87070 CULTURE OTHR SPECIMN AEROBIC: CPT

## 2022-01-19 PROCEDURE — 31654 BRONCH EBUS IVNTJ PERPH LES: CPT

## 2022-01-19 PROCEDURE — 87205 SMEAR GRAM STAIN: CPT

## 2022-01-19 PROCEDURE — 88108 CYTOPATH CONCENTRATE TECH: CPT

## 2022-01-19 PROCEDURE — 71045 X-RAY EXAM CHEST 1 VIEW: CPT

## 2022-01-19 PROCEDURE — 88341 IMHCHEM/IMCYTCHM EA ADD ANTB: CPT

## 2022-01-19 PROCEDURE — 88104 CYTOPATH FL NONGYN SMEARS: CPT

## 2022-01-19 PROCEDURE — 31652 BRONCH EBUS SAMPLNG 1/2 NODE: CPT

## 2022-01-19 PROCEDURE — 87186 SC STD MICRODIL/AGAR DIL: CPT

## 2022-01-19 PROCEDURE — 31623 DX BRONCHOSCOPE/BRUSH: CPT

## 2022-02-01 ENCOUNTER — HOSPITAL ENCOUNTER (OUTPATIENT)
Dept: HOSPITAL 53 - M PLARAD | Age: 67
End: 2022-02-01
Attending: STUDENT IN AN ORGANIZED HEALTH CARE EDUCATION/TRAINING PROGRAM
Payer: MEDICARE

## 2022-02-01 DIAGNOSIS — R91.1: Primary | ICD-10-CM

## 2022-02-01 PROCEDURE — 78815 PET IMAGE W/CT SKULL-THIGH: CPT

## 2022-04-01 ENCOUNTER — HOSPITAL ENCOUNTER (OUTPATIENT)
Dept: HOSPITAL 53 - M PLAIMG | Age: 67
End: 2022-04-01
Attending: INTERNAL MEDICINE
Payer: MEDICARE

## 2022-04-01 DIAGNOSIS — C20: ICD-10-CM

## 2022-04-01 DIAGNOSIS — R68.82: ICD-10-CM

## 2022-04-01 DIAGNOSIS — C34.31: Primary | ICD-10-CM

## 2022-04-01 DIAGNOSIS — R90.82: ICD-10-CM

## 2022-04-01 PROCEDURE — 70553 MRI BRAIN STEM W/O & W/DYE: CPT

## 2022-07-11 ENCOUNTER — HOSPITAL ENCOUNTER (INPATIENT)
Dept: HOSPITAL 53 - M ED | Age: 67
LOS: 4 days | Discharge: HOME | DRG: 177 | End: 2022-07-15
Attending: FAMILY MEDICINE | Admitting: FAMILY MEDICINE
Payer: MEDICARE

## 2022-07-11 VITALS — HEIGHT: 73 IN | WEIGHT: 276.9 LBS | BODY MASS INDEX: 36.7 KG/M2

## 2022-07-11 VITALS — SYSTOLIC BLOOD PRESSURE: 156 MMHG | DIASTOLIC BLOOD PRESSURE: 75 MMHG

## 2022-07-11 VITALS — OXYGEN SATURATION: 98 %

## 2022-07-11 DIAGNOSIS — E78.5: ICD-10-CM

## 2022-07-11 DIAGNOSIS — Z99.81: ICD-10-CM

## 2022-07-11 DIAGNOSIS — Z92.3: ICD-10-CM

## 2022-07-11 DIAGNOSIS — C77.9: ICD-10-CM

## 2022-07-11 DIAGNOSIS — Z85.048: ICD-10-CM

## 2022-07-11 DIAGNOSIS — C34.91: ICD-10-CM

## 2022-07-11 DIAGNOSIS — J96.02: ICD-10-CM

## 2022-07-11 DIAGNOSIS — Z79.899: ICD-10-CM

## 2022-07-11 DIAGNOSIS — G47.33: ICD-10-CM

## 2022-07-11 DIAGNOSIS — J44.1: ICD-10-CM

## 2022-07-11 DIAGNOSIS — F17.210: ICD-10-CM

## 2022-07-11 DIAGNOSIS — K21.9: ICD-10-CM

## 2022-07-11 DIAGNOSIS — I11.9: ICD-10-CM

## 2022-07-11 DIAGNOSIS — Z66: ICD-10-CM

## 2022-07-11 DIAGNOSIS — R04.2: ICD-10-CM

## 2022-07-11 DIAGNOSIS — M10.9: ICD-10-CM

## 2022-07-11 DIAGNOSIS — Z92.21: ICD-10-CM

## 2022-07-11 DIAGNOSIS — J96.01: ICD-10-CM

## 2022-07-11 DIAGNOSIS — U07.1: Primary | ICD-10-CM

## 2022-07-11 LAB
ALBUMIN SERPL BCG-MCNC: 3.3 GM/DL (ref 3.2–5.2)
ALBUMIN SERPL BCG-MCNC: 3.4 GM/DL (ref 3.2–5.2)
ALT SERPL W P-5'-P-CCNC: 18 U/L (ref 12–78)
ALT SERPL W P-5'-P-CCNC: 18 U/L (ref 12–78)
APTT BLD: 32.6 SECONDS (ref 25.9–37)
BASE EXCESS BLDA CALC-SCNC: 0.3 MMOL/L (ref -2–2)
BASE EXCESS BLDA CALC-SCNC: 3.3 MMOL/L (ref -2–2)
BASE EXCESS BLDA CALC-SCNC: 4.9 MMOL/L (ref -2–2)
BASOPHILS # BLD AUTO: 0 10^3/UL (ref 0–0.2)
BASOPHILS NFR BLD AUTO: 0.3 % (ref 0–1)
BILIRUB CONJ SERPL-MCNC: 0.1 MG/DL (ref 0–0.2)
BILIRUB CONJ SERPL-MCNC: < 0.1 MG/DL (ref 0–0.2)
BILIRUB SERPL-MCNC: 0.5 MG/DL (ref 0.2–1)
BILIRUB SERPL-MCNC: 0.7 MG/DL (ref 0.2–1)
BUN SERPL-MCNC: 14 MG/DL (ref 7–18)
CALCIUM SERPL-MCNC: 8.9 MG/DL (ref 8.8–10.2)
CHLORIDE SERPL-SCNC: 103 MEQ/L (ref 98–107)
CO2 BLDA CALC-SCNC: 30.9 MEQ/L (ref 23–31)
CO2 BLDA CALC-SCNC: 36.1 MEQ/L (ref 23–31)
CO2 BLDA CALC-SCNC: 38.1 MEQ/L (ref 23–31)
CO2 SERPL-SCNC: 35 MEQ/L (ref 21–32)
CREAT SERPL-MCNC: 0.75 MG/DL (ref 0.7–1.3)
CRP SERPL-MCNC: 8.23 MG/DL (ref 0–0.3)
D DIMER PPP DDU-MCNC: 847.92 NG/ML (ref ?–500)
EOSINOPHIL # BLD AUTO: 0.1 10^3/UL (ref 0–0.5)
EOSINOPHIL NFR BLD AUTO: 0.9 % (ref 0–3)
FERRITIN SERPL-MCNC: 146 NG/ML (ref 26–388)
FIBRINOGEN PPP-MCNC: 568 MG/DL (ref 268–480)
GFR SERPL CREATININE-BSD FRML MDRD: > 60 ML/MIN/{1.73_M2} (ref 49–?)
GLUCOSE SERPL-MCNC: 84 MG/DL (ref 70–100)
HCO3 BLDA-SCNC: 28.9 MEQ/L (ref 22–26)
HCO3 BLDA-SCNC: 33.6 MEQ/L (ref 22–26)
HCO3 BLDA-SCNC: 35.5 MEQ/L (ref 22–26)
HCO3 STD BLDA-SCNC: 24.7 MEQ/L (ref 22–26)
HCO3 STD BLDA-SCNC: 27.3 MEQ/L (ref 22–26)
HCO3 STD BLDA-SCNC: 28.8 MEQ/L (ref 22–26)
HCT VFR BLD AUTO: 45.9 % (ref 42–52)
HGB BLD-MCNC: 14 G/DL (ref 13.5–17.5)
INR PPP: 0.96
LDH SERPL L TO P-CCNC: 332 U/L (ref 87–241)
LYMPHOCYTES # BLD AUTO: 0.5 10^3/UL (ref 1.5–5)
LYMPHOCYTES NFR BLD AUTO: 6.8 % (ref 24–44)
MAGNESIUM SERPL-MCNC: 2.2 MG/DL (ref 1.8–2.4)
MCH RBC QN AUTO: 29.9 PG (ref 27–33)
MCHC RBC AUTO-ENTMCNC: 30.5 G/DL (ref 32–36.5)
MCV RBC AUTO: 97.9 FL (ref 80–96)
MONOCYTES # BLD AUTO: 0.9 10^3/UL (ref 0–0.8)
MONOCYTES NFR BLD AUTO: 12.4 % (ref 2–8)
NEUTROPHILS # BLD AUTO: 5.6 10^3/UL (ref 1.5–8.5)
NEUTROPHILS NFR BLD AUTO: 79 % (ref 36–66)
NT-PRO BNP: 401 PG/ML (ref ?–125)
PCO2 BLDA: 64.4 MMHG (ref 35–45)
PCO2 BLDA: 80.4 MMHG (ref 35–45)
PCO2 BLDA: 84.7 MMHG (ref 35–45)
PH BLDA: 7.24 UNITS (ref 7.35–7.45)
PH BLDA: 7.24 UNITS (ref 7.35–7.45)
PH BLDA: 7.27 UNITS (ref 7.35–7.45)
PLATELET # BLD AUTO: 110 10^3/UL (ref 150–450)
PO2 BLDA: 69.8 MMHG (ref 75–100)
PO2 BLDA: 71.8 MMHG (ref 75–100)
PO2 BLDA: 73 MMHG (ref 75–100)
POTASSIUM SERPL-SCNC: 4.3 MEQ/L (ref 3.5–5.1)
PROT SERPL-MCNC: 6.2 GM/DL (ref 6.4–8.2)
PROT SERPL-MCNC: 6.6 GM/DL (ref 6.4–8.2)
PROTHROMBIN TIME: 13.2 SECONDS (ref 12.7–14.5)
RBC # BLD AUTO: 4.69 10^6/UL (ref 4.3–6.1)
SAO2 % BLDA: 92.5 % (ref 95–99)
SAO2 % BLDA: 92.9 % (ref 95–99)
SAO2 % BLDA: 93.4 % (ref 95–99)
SODIUM SERPL-SCNC: 140 MEQ/L (ref 136–145)
WBC # BLD AUTO: 7 10^3/UL (ref 4–10)

## 2022-07-11 PROCEDURE — XW033E5 INTRODUCTION OF REMDESIVIR ANTI-INFECTIVE INTO PERIPHERAL VEIN, PERCUTANEOUS APPROACH, NEW TECHNOLOGY GROUP 5: ICD-10-PCS | Performed by: FAMILY MEDICINE

## 2022-07-11 PROCEDURE — 5A09457 ASSISTANCE WITH RESPIRATORY VENTILATION, 24-96 CONSECUTIVE HOURS, CONTINUOUS POSITIVE AIRWAY PRESSURE: ICD-10-PCS | Performed by: FAMILY MEDICINE

## 2022-07-11 PROCEDURE — 3E0333Z INTRODUCTION OF ANTI-INFLAMMATORY INTO PERIPHERAL VEIN, PERCUTANEOUS APPROACH: ICD-10-PCS | Performed by: FAMILY MEDICINE

## 2022-07-11 RX ADMIN — IPRATROPIUM BROMIDE AND ALBUTEROL SCH PUFF: 20; 100 SPRAY, METERED RESPIRATORY (INHALATION) at 19:50

## 2022-07-11 RX ADMIN — DEXTROSE MONOHYDRATE SCH MLS/HR: 5 INJECTION INTRAVENOUS at 17:59

## 2022-07-11 RX ADMIN — DEXAMETHASONE SODIUM PHOSPHATE SCH MG: 4 INJECTION, SOLUTION INTRAMUSCULAR; INTRAVENOUS at 16:01

## 2022-07-11 RX ADMIN — ENOXAPARIN SODIUM SCH MG: 40 INJECTION SUBCUTANEOUS at 19:27

## 2022-07-12 VITALS — SYSTOLIC BLOOD PRESSURE: 131 MMHG | DIASTOLIC BLOOD PRESSURE: 76 MMHG | OXYGEN SATURATION: 93 %

## 2022-07-12 VITALS — SYSTOLIC BLOOD PRESSURE: 110 MMHG | DIASTOLIC BLOOD PRESSURE: 56 MMHG

## 2022-07-12 VITALS — DIASTOLIC BLOOD PRESSURE: 61 MMHG | SYSTOLIC BLOOD PRESSURE: 113 MMHG

## 2022-07-12 VITALS — DIASTOLIC BLOOD PRESSURE: 58 MMHG | SYSTOLIC BLOOD PRESSURE: 126 MMHG

## 2022-07-12 VITALS — OXYGEN SATURATION: 98 %

## 2022-07-12 VITALS — SYSTOLIC BLOOD PRESSURE: 122 MMHG | DIASTOLIC BLOOD PRESSURE: 77 MMHG

## 2022-07-12 VITALS — SYSTOLIC BLOOD PRESSURE: 168 MMHG | DIASTOLIC BLOOD PRESSURE: 75 MMHG

## 2022-07-12 VITALS — SYSTOLIC BLOOD PRESSURE: 139 MMHG | OXYGEN SATURATION: 93 % | DIASTOLIC BLOOD PRESSURE: 65 MMHG

## 2022-07-12 VITALS — DIASTOLIC BLOOD PRESSURE: 65 MMHG | SYSTOLIC BLOOD PRESSURE: 147 MMHG

## 2022-07-12 VITALS — SYSTOLIC BLOOD PRESSURE: 119 MMHG | OXYGEN SATURATION: 94 % | DIASTOLIC BLOOD PRESSURE: 60 MMHG

## 2022-07-12 VITALS — DIASTOLIC BLOOD PRESSURE: 71 MMHG | SYSTOLIC BLOOD PRESSURE: 124 MMHG

## 2022-07-12 VITALS — DIASTOLIC BLOOD PRESSURE: 62 MMHG | SYSTOLIC BLOOD PRESSURE: 121 MMHG

## 2022-07-12 VITALS — OXYGEN SATURATION: 94 %

## 2022-07-12 VITALS — SYSTOLIC BLOOD PRESSURE: 154 MMHG | OXYGEN SATURATION: 87 % | DIASTOLIC BLOOD PRESSURE: 72 MMHG

## 2022-07-12 VITALS — DIASTOLIC BLOOD PRESSURE: 65 MMHG | SYSTOLIC BLOOD PRESSURE: 139 MMHG

## 2022-07-12 LAB
BASE EXCESS BLDA CALC-SCNC: 2.1 MMOL/L (ref -2–2)
BASE EXCESS BLDA CALC-SCNC: 6.4 MMOL/L (ref -2–2)
BASE EXCESS BLDA CALC-SCNC: 7.1 MMOL/L (ref -2–2)
BASE EXCESS BLDA CALC-SCNC: 9.9 MMOL/L (ref -2–2)
BASOPHILS # BLD AUTO: 0 10^3/UL (ref 0–0.2)
BASOPHILS NFR BLD AUTO: 0 % (ref 0–1)
BUN SERPL-MCNC: 15 MG/DL (ref 7–18)
CALCIUM SERPL-MCNC: 8.5 MG/DL (ref 8.8–10.2)
CHLORIDE SERPL-SCNC: 103 MEQ/L (ref 98–107)
CO2 BLDA CALC-SCNC: 34.1 MEQ/L (ref 23–31)
CO2 BLDA CALC-SCNC: 37.4 MEQ/L (ref 23–31)
CO2 BLDA CALC-SCNC: 39.4 MEQ/L (ref 23–31)
CO2 BLDA CALC-SCNC: 46.4 MEQ/L (ref 23–31)
CO2 SERPL-SCNC: 38 MEQ/L (ref 21–32)
CREAT SERPL-MCNC: 0.6 MG/DL (ref 0.7–1.3)
EOSINOPHIL # BLD AUTO: 0 10^3/UL (ref 0–0.5)
EOSINOPHIL NFR BLD AUTO: 0 % (ref 0–3)
GFR SERPL CREATININE-BSD FRML MDRD: > 60 ML/MIN/{1.73_M2} (ref 49–?)
GLUCOSE SERPL-MCNC: 106 MG/DL (ref 70–100)
HCO3 BLDA-SCNC: 31.8 MEQ/L (ref 22–26)
HCO3 BLDA-SCNC: 35.2 MEQ/L (ref 22–26)
HCO3 BLDA-SCNC: 37 MEQ/L (ref 22–26)
HCO3 BLDA-SCNC: 42.9 MEQ/L (ref 22–26)
HCO3 STD BLDA-SCNC: 26.3 MEQ/L (ref 22–26)
HCO3 STD BLDA-SCNC: 30.2 MEQ/L (ref 22–26)
HCO3 STD BLDA-SCNC: 31 MEQ/L (ref 22–26)
HCO3 STD BLDA-SCNC: 33.6 MEQ/L (ref 22–26)
HCT VFR BLD AUTO: 44.6 % (ref 42–52)
HGB BLD-MCNC: 13.3 G/DL (ref 13.5–17.5)
LYMPHOCYTES # BLD AUTO: 0.4 10^3/UL (ref 1.5–5)
LYMPHOCYTES NFR BLD AUTO: 6.5 % (ref 24–44)
MAGNESIUM SERPL-MCNC: 2 MG/DL (ref 1.8–2.4)
MCH RBC QN AUTO: 29.4 PG (ref 27–33)
MCHC RBC AUTO-ENTMCNC: 29.8 G/DL (ref 32–36.5)
MCV RBC AUTO: 98.5 FL (ref 80–96)
MONOCYTES # BLD AUTO: 0.5 10^3/UL (ref 0–0.8)
MONOCYTES NFR BLD AUTO: 8.2 % (ref 2–8)
NEUTROPHILS # BLD AUTO: 4.9 10^3/UL (ref 1.5–8.5)
NEUTROPHILS NFR BLD AUTO: 85.1 % (ref 36–66)
PCO2 BLDA: 112.1 MMHG (ref 35–45)
PCO2 BLDA: 71 MMHG (ref 35–45)
PCO2 BLDA: 75.1 MMHG (ref 35–45)
PCO2 BLDA: 79.4 MMHG (ref 35–45)
PH BLDA: 7.2 UNITS (ref 7.35–7.45)
PH BLDA: 7.24 UNITS (ref 7.35–7.45)
PH BLDA: 7.29 UNITS (ref 7.35–7.45)
PH BLDA: 7.31 UNITS (ref 7.35–7.45)
PLATELET # BLD AUTO: 105 10^3/UL (ref 150–450)
PO2 BLDA: 169.9 MMHG (ref 75–100)
PO2 BLDA: 74.4 MMHG (ref 75–100)
PO2 BLDA: 84.9 MMHG (ref 75–100)
PO2 BLDA: 98.2 MMHG (ref 75–100)
POTASSIUM SERPL-SCNC: 4.9 MEQ/L (ref 3.5–5.1)
RBC # BLD AUTO: 4.53 10^6/UL (ref 4.3–6.1)
SAO2 % BLDA: 94.8 % (ref 95–99)
SAO2 % BLDA: 95.7 % (ref 95–99)
SAO2 % BLDA: 96.7 % (ref 95–99)
SAO2 % BLDA: 98.8 % (ref 95–99)
SODIUM SERPL-SCNC: 139 MEQ/L (ref 136–145)
WBC # BLD AUTO: 5.7 10^3/UL (ref 4–10)

## 2022-07-12 RX ADMIN — BARICITINIB SCH MG: 2 TABLET, FILM COATED ORAL at 10:24

## 2022-07-12 RX ADMIN — DEXAMETHASONE SODIUM PHOSPHATE SCH MG: 4 INJECTION, SOLUTION INTRAMUSCULAR; INTRAVENOUS at 10:23

## 2022-07-12 RX ADMIN — FAMOTIDINE SCH MG: 20 TABLET, FILM COATED ORAL at 10:24

## 2022-07-12 RX ADMIN — IPRATROPIUM BROMIDE AND ALBUTEROL SCH PUFF: 20; 100 SPRAY, METERED RESPIRATORY (INHALATION) at 02:00

## 2022-07-12 RX ADMIN — IPRATROPIUM BROMIDE AND ALBUTEROL SCH PUFF: 20; 100 SPRAY, METERED RESPIRATORY (INHALATION) at 13:38

## 2022-07-12 RX ADMIN — IPRATROPIUM BROMIDE AND ALBUTEROL SCH PUFF: 20; 100 SPRAY, METERED RESPIRATORY (INHALATION) at 20:08

## 2022-07-12 RX ADMIN — IPRATROPIUM BROMIDE AND ALBUTEROL SCH PUFF: 20; 100 SPRAY, METERED RESPIRATORY (INHALATION) at 13:37

## 2022-07-12 RX ADMIN — DEXTROSE MONOHYDRATE SCH MLS/HR: 5 INJECTION INTRAVENOUS at 05:18

## 2022-07-12 RX ADMIN — ENOXAPARIN SODIUM SCH MG: 40 INJECTION SUBCUTANEOUS at 17:16

## 2022-07-12 RX ADMIN — MONTELUKAST SODIUM SCH MG: 10 TABLET, FILM COATED ORAL at 10:24

## 2022-07-12 RX ADMIN — ENOXAPARIN SODIUM SCH MG: 40 INJECTION SUBCUTANEOUS at 05:18

## 2022-07-13 VITALS — OXYGEN SATURATION: 6 %

## 2022-07-13 VITALS — OXYGEN SATURATION: 92 % | DIASTOLIC BLOOD PRESSURE: 63 MMHG | SYSTOLIC BLOOD PRESSURE: 131 MMHG

## 2022-07-13 VITALS — SYSTOLIC BLOOD PRESSURE: 115 MMHG | DIASTOLIC BLOOD PRESSURE: 66 MMHG

## 2022-07-13 VITALS — DIASTOLIC BLOOD PRESSURE: 79 MMHG | OXYGEN SATURATION: 93 % | SYSTOLIC BLOOD PRESSURE: 137 MMHG

## 2022-07-13 VITALS — DIASTOLIC BLOOD PRESSURE: 57 MMHG | SYSTOLIC BLOOD PRESSURE: 117 MMHG | OXYGEN SATURATION: 93 %

## 2022-07-13 VITALS — SYSTOLIC BLOOD PRESSURE: 128 MMHG | OXYGEN SATURATION: 92 % | DIASTOLIC BLOOD PRESSURE: 63 MMHG

## 2022-07-13 VITALS — SYSTOLIC BLOOD PRESSURE: 130 MMHG | DIASTOLIC BLOOD PRESSURE: 63 MMHG

## 2022-07-13 VITALS — SYSTOLIC BLOOD PRESSURE: 159 MMHG | OXYGEN SATURATION: 94 % | DIASTOLIC BLOOD PRESSURE: 72 MMHG

## 2022-07-13 VITALS — OXYGEN SATURATION: 95 % | DIASTOLIC BLOOD PRESSURE: 70 MMHG | SYSTOLIC BLOOD PRESSURE: 127 MMHG

## 2022-07-13 VITALS — OXYGEN SATURATION: 97 %

## 2022-07-13 VITALS — DIASTOLIC BLOOD PRESSURE: 66 MMHG | SYSTOLIC BLOOD PRESSURE: 144 MMHG

## 2022-07-13 LAB
ALBUMIN SERPL BCG-MCNC: 2.7 GM/DL (ref 3.2–5.2)
ALT SERPL W P-5'-P-CCNC: 14 U/L (ref 12–78)
APTT BLD: 30.9 SECONDS (ref 25.9–37)
BASE EXCESS BLDA CALC-SCNC: 10.7 MMOL/L (ref -2–2)
BASOPHILS # BLD AUTO: 0 10^3/UL (ref 0–0.2)
BASOPHILS NFR BLD AUTO: 0.2 % (ref 0–1)
BILIRUB CONJ SERPL-MCNC: 0.1 MG/DL (ref 0–0.2)
BILIRUB SERPL-MCNC: 0.3 MG/DL (ref 0.2–1)
BUN SERPL-MCNC: 17 MG/DL (ref 7–18)
CALCIUM SERPL-MCNC: 8.8 MG/DL (ref 8.8–10.2)
CHLORIDE SERPL-SCNC: 101 MEQ/L (ref 98–107)
CO2 BLDA CALC-SCNC: 41.2 MEQ/L (ref 23–31)
CO2 SERPL-SCNC: 43 MEQ/L (ref 21–32)
CREAT SERPL-MCNC: 0.75 MG/DL (ref 0.7–1.3)
EOSINOPHIL # BLD AUTO: 0 10^3/UL (ref 0–0.5)
EOSINOPHIL NFR BLD AUTO: 0.2 % (ref 0–3)
FERRITIN SERPL-MCNC: 125 NG/ML (ref 26–388)
FIBRINOGEN PPP-MCNC: 493 MG/DL (ref 268–480)
GFR SERPL CREATININE-BSD FRML MDRD: > 60 ML/MIN/{1.73_M2} (ref 49–?)
GLUCOSE SERPL-MCNC: 88 MG/DL (ref 70–100)
HCO3 BLDA-SCNC: 39.1 MEQ/L (ref 22–26)
HCO3 STD BLDA-SCNC: 34.4 MEQ/L (ref 22–26)
HCT VFR BLD AUTO: 44.3 % (ref 42–52)
HGB BLD-MCNC: 13.2 G/DL (ref 13.5–17.5)
INR PPP: 0.96
LDH SERPL L TO P-CCNC: 143 U/L (ref 87–241)
LYMPHOCYTES # BLD AUTO: 0.7 10^3/UL (ref 1.5–5)
LYMPHOCYTES NFR BLD AUTO: 10.8 % (ref 24–44)
MAGNESIUM SERPL-MCNC: 1.8 MG/DL (ref 1.8–2.4)
MCH RBC QN AUTO: 29.1 PG (ref 27–33)
MCHC RBC AUTO-ENTMCNC: 29.8 G/DL (ref 32–36.5)
MCV RBC AUTO: 97.8 FL (ref 80–96)
MONOCYTES # BLD AUTO: 0.5 10^3/UL (ref 0–0.8)
MONOCYTES NFR BLD AUTO: 7.8 % (ref 2–8)
NEUTROPHILS # BLD AUTO: 5.1 10^3/UL (ref 1.5–8.5)
NEUTROPHILS NFR BLD AUTO: 80.7 % (ref 36–66)
NT-PRO BNP: 89 PG/ML (ref ?–125)
PCO2 BLDA: 70.4 MMHG (ref 35–45)
PH BLDA: 7.36 UNITS (ref 7.35–7.45)
PLATELET # BLD AUTO: 101 10^3/UL (ref 150–450)
PO2 BLDA: 79.4 MMHG (ref 75–100)
POTASSIUM SERPL-SCNC: 5.3 MEQ/L (ref 3.5–5.1)
PROT SERPL-MCNC: 5.6 GM/DL (ref 6.4–8.2)
PROTHROMBIN TIME: 13.2 SECONDS (ref 12.7–14.5)
RBC # BLD AUTO: 4.53 10^6/UL (ref 4.3–6.1)
SAO2 % BLDA: 95.8 % (ref 95–99)
SODIUM SERPL-SCNC: 139 MEQ/L (ref 136–145)
WBC # BLD AUTO: 6.3 10^3/UL (ref 4–10)

## 2022-07-13 RX ADMIN — IPRATROPIUM BROMIDE AND ALBUTEROL SCH PUFF: 20; 100 SPRAY, METERED RESPIRATORY (INHALATION) at 01:15

## 2022-07-13 RX ADMIN — ENOXAPARIN SODIUM SCH MG: 40 INJECTION SUBCUTANEOUS at 18:19

## 2022-07-13 RX ADMIN — ENOXAPARIN SODIUM SCH MG: 40 INJECTION SUBCUTANEOUS at 05:14

## 2022-07-13 RX ADMIN — IPRATROPIUM BROMIDE AND ALBUTEROL SCH PUFF: 20; 100 SPRAY, METERED RESPIRATORY (INHALATION) at 15:02

## 2022-07-13 RX ADMIN — BARICITINIB SCH MG: 2 TABLET, FILM COATED ORAL at 08:56

## 2022-07-13 RX ADMIN — IPRATROPIUM BROMIDE AND ALBUTEROL SCH PUFF: 20; 100 SPRAY, METERED RESPIRATORY (INHALATION) at 08:14

## 2022-07-13 RX ADMIN — MONTELUKAST SODIUM SCH MG: 10 TABLET, FILM COATED ORAL at 08:55

## 2022-07-13 RX ADMIN — FAMOTIDINE SCH MG: 20 TABLET, FILM COATED ORAL at 08:56

## 2022-07-13 RX ADMIN — IPRATROPIUM BROMIDE AND ALBUTEROL SCH PUFF: 20; 100 SPRAY, METERED RESPIRATORY (INHALATION) at 20:20

## 2022-07-13 RX ADMIN — DEXAMETHASONE SODIUM PHOSPHATE SCH MG: 4 INJECTION, SOLUTION INTRAMUSCULAR; INTRAVENOUS at 08:57

## 2022-07-14 VITALS — SYSTOLIC BLOOD PRESSURE: 174 MMHG | DIASTOLIC BLOOD PRESSURE: 93 MMHG

## 2022-07-14 VITALS — DIASTOLIC BLOOD PRESSURE: 72 MMHG | SYSTOLIC BLOOD PRESSURE: 160 MMHG

## 2022-07-14 VITALS — SYSTOLIC BLOOD PRESSURE: 147 MMHG | DIASTOLIC BLOOD PRESSURE: 66 MMHG

## 2022-07-14 VITALS — OXYGEN SATURATION: 94 %

## 2022-07-14 VITALS — SYSTOLIC BLOOD PRESSURE: 162 MMHG | OXYGEN SATURATION: 94 % | DIASTOLIC BLOOD PRESSURE: 77 MMHG

## 2022-07-14 VITALS — DIASTOLIC BLOOD PRESSURE: 65 MMHG | SYSTOLIC BLOOD PRESSURE: 141 MMHG

## 2022-07-14 VITALS — DIASTOLIC BLOOD PRESSURE: 70 MMHG | SYSTOLIC BLOOD PRESSURE: 150 MMHG

## 2022-07-14 VITALS — SYSTOLIC BLOOD PRESSURE: 130 MMHG | DIASTOLIC BLOOD PRESSURE: 65 MMHG

## 2022-07-14 LAB
BASOPHILS # BLD AUTO: 0 10^3/UL (ref 0–0.2)
BASOPHILS NFR BLD AUTO: 0.2 % (ref 0–1)
BUN SERPL-MCNC: 21 MG/DL (ref 7–18)
CALCIUM SERPL-MCNC: 9.1 MG/DL (ref 8.8–10.2)
CHLORIDE SERPL-SCNC: 100 MEQ/L (ref 98–107)
CO2 SERPL-SCNC: 39 MEQ/L (ref 21–32)
CREAT SERPL-MCNC: 0.78 MG/DL (ref 0.7–1.3)
EOSINOPHIL # BLD AUTO: 0 10^3/UL (ref 0–0.5)
EOSINOPHIL NFR BLD AUTO: 0.2 % (ref 0–3)
GFR SERPL CREATININE-BSD FRML MDRD: > 60 ML/MIN/{1.73_M2} (ref 49–?)
GLUCOSE SERPL-MCNC: 84 MG/DL (ref 70–100)
HCT VFR BLD AUTO: 41.3 % (ref 42–52)
HGB BLD-MCNC: 13.1 G/DL (ref 13.5–17.5)
LYMPHOCYTES # BLD AUTO: 0.9 10^3/UL (ref 1.5–5)
LYMPHOCYTES NFR BLD AUTO: 15.4 % (ref 24–44)
MAGNESIUM SERPL-MCNC: 1.9 MG/DL (ref 1.8–2.4)
MCH RBC QN AUTO: 30.7 PG (ref 27–33)
MCHC RBC AUTO-ENTMCNC: 31.7 G/DL (ref 32–36.5)
MCV RBC AUTO: 96.7 FL (ref 80–96)
MONOCYTES # BLD AUTO: 0.6 10^3/UL (ref 0–0.8)
MONOCYTES NFR BLD AUTO: 9.6 % (ref 2–8)
NEUTROPHILS # BLD AUTO: 4.5 10^3/UL (ref 1.5–8.5)
NEUTROPHILS NFR BLD AUTO: 74.4 % (ref 36–66)
PLATELET # BLD AUTO: 104 10^3/UL (ref 150–450)
POTASSIUM SERPL-SCNC: 4.7 MEQ/L (ref 3.5–5.1)
RBC # BLD AUTO: 4.27 10^6/UL (ref 4.3–6.1)
SODIUM SERPL-SCNC: 142 MEQ/L (ref 136–145)
WBC # BLD AUTO: 6 10^3/UL (ref 4–10)

## 2022-07-14 RX ADMIN — IPRATROPIUM BROMIDE AND ALBUTEROL SCH PUFF: 20; 100 SPRAY, METERED RESPIRATORY (INHALATION) at 13:24

## 2022-07-14 RX ADMIN — BARICITINIB SCH MG: 2 TABLET, FILM COATED ORAL at 08:11

## 2022-07-14 RX ADMIN — IPRATROPIUM BROMIDE AND ALBUTEROL SCH PUFF: 20; 100 SPRAY, METERED RESPIRATORY (INHALATION) at 19:49

## 2022-07-14 RX ADMIN — IPRATROPIUM BROMIDE AND ALBUTEROL SCH PUFF: 20; 100 SPRAY, METERED RESPIRATORY (INHALATION) at 07:12

## 2022-07-14 RX ADMIN — DEXAMETHASONE SODIUM PHOSPHATE SCH MG: 4 INJECTION, SOLUTION INTRAMUSCULAR; INTRAVENOUS at 08:12

## 2022-07-14 RX ADMIN — ENOXAPARIN SODIUM SCH MG: 40 INJECTION SUBCUTANEOUS at 18:06

## 2022-07-14 RX ADMIN — IPRATROPIUM BROMIDE AND ALBUTEROL SCH PUFF: 20; 100 SPRAY, METERED RESPIRATORY (INHALATION) at 02:02

## 2022-07-14 RX ADMIN — MONTELUKAST SODIUM SCH MG: 10 TABLET, FILM COATED ORAL at 08:11

## 2022-07-14 RX ADMIN — FAMOTIDINE SCH MG: 20 TABLET, FILM COATED ORAL at 08:11

## 2022-07-14 RX ADMIN — ENOXAPARIN SODIUM SCH MG: 40 INJECTION SUBCUTANEOUS at 06:30

## 2022-07-15 VITALS — DIASTOLIC BLOOD PRESSURE: 80 MMHG | SYSTOLIC BLOOD PRESSURE: 150 MMHG

## 2022-07-15 VITALS — DIASTOLIC BLOOD PRESSURE: 70 MMHG | SYSTOLIC BLOOD PRESSURE: 165 MMHG

## 2022-07-15 LAB
ALBUMIN SERPL BCG-MCNC: 2.9 GM/DL (ref 3.2–5.2)
ALT SERPL W P-5'-P-CCNC: 34 U/L (ref 12–78)
APTT BLD: 27 SECONDS (ref 25.9–37)
BASOPHILS # BLD AUTO: 0 10^3/UL (ref 0–0.2)
BASOPHILS NFR BLD AUTO: 0.2 % (ref 0–1)
BILIRUB CONJ SERPL-MCNC: 0.1 MG/DL (ref 0–0.2)
BILIRUB SERPL-MCNC: 0.4 MG/DL (ref 0.2–1)
BUN SERPL-MCNC: 19 MG/DL (ref 7–18)
CALCIUM SERPL-MCNC: 9.1 MG/DL (ref 8.8–10.2)
CHLORIDE SERPL-SCNC: 102 MEQ/L (ref 98–107)
CO2 SERPL-SCNC: 36 MEQ/L (ref 21–32)
CREAT SERPL-MCNC: 0.65 MG/DL (ref 0.7–1.3)
EOSINOPHIL # BLD AUTO: 0 10^3/UL (ref 0–0.5)
EOSINOPHIL NFR BLD AUTO: 0.3 % (ref 0–3)
FERRITIN SERPL-MCNC: 111 NG/ML (ref 26–388)
FIBRINOGEN PPP-MCNC: 396 MG/DL (ref 268–480)
GFR SERPL CREATININE-BSD FRML MDRD: > 60 ML/MIN/{1.73_M2} (ref 49–?)
GLUCOSE SERPL-MCNC: 94 MG/DL (ref 70–100)
HCT VFR BLD AUTO: 43.8 % (ref 42–52)
HGB BLD-MCNC: 13.7 G/DL (ref 13.5–17.5)
INR PPP: 0.89
LDH SERPL L TO P-CCNC: 133 U/L (ref 87–241)
LYMPHOCYTES # BLD AUTO: 0.8 10^3/UL (ref 1.5–5)
LYMPHOCYTES NFR BLD AUTO: 13.8 % (ref 24–44)
MAGNESIUM SERPL-MCNC: 1.8 MG/DL (ref 1.8–2.4)
MCH RBC QN AUTO: 29.7 PG (ref 27–33)
MCHC RBC AUTO-ENTMCNC: 31.3 G/DL (ref 32–36.5)
MCV RBC AUTO: 95 FL (ref 80–96)
MONOCYTES # BLD AUTO: 0.7 10^3/UL (ref 0–0.8)
MONOCYTES NFR BLD AUTO: 12.6 % (ref 2–8)
NEUTROPHILS # BLD AUTO: 4.3 10^3/UL (ref 1.5–8.5)
NEUTROPHILS NFR BLD AUTO: 72.9 % (ref 36–66)
NT-PRO BNP: 238 PG/ML (ref ?–125)
PLATELET # BLD AUTO: 102 10^3/UL (ref 150–450)
POTASSIUM SERPL-SCNC: 4.2 MEQ/L (ref 3.5–5.1)
PROT SERPL-MCNC: 5.6 GM/DL (ref 6.4–8.2)
PROTHROMBIN TIME: 12.4 SECONDS (ref 12.7–14.5)
RBC # BLD AUTO: 4.61 10^6/UL (ref 4.3–6.1)
SODIUM SERPL-SCNC: 143 MEQ/L (ref 136–145)
WBC # BLD AUTO: 5.9 10^3/UL (ref 4–10)

## 2022-07-15 RX ADMIN — IPRATROPIUM BROMIDE AND ALBUTEROL SCH PUFF: 20; 100 SPRAY, METERED RESPIRATORY (INHALATION) at 07:10

## 2022-07-15 RX ADMIN — IPRATROPIUM BROMIDE AND ALBUTEROL SCH PUFF: 20; 100 SPRAY, METERED RESPIRATORY (INHALATION) at 01:18

## 2022-07-15 RX ADMIN — BARICITINIB SCH MG: 2 TABLET, FILM COATED ORAL at 08:26

## 2022-07-15 RX ADMIN — ENOXAPARIN SODIUM SCH MG: 40 INJECTION SUBCUTANEOUS at 06:00

## 2022-07-15 RX ADMIN — FAMOTIDINE SCH MG: 20 TABLET, FILM COATED ORAL at 08:24

## 2022-07-15 RX ADMIN — MONTELUKAST SODIUM SCH MG: 10 TABLET, FILM COATED ORAL at 08:24

## 2022-08-07 ENCOUNTER — HOSPITAL ENCOUNTER (INPATIENT)
Dept: HOSPITAL 53 - M ED | Age: 67
LOS: 4 days | Discharge: HOME | DRG: 177 | End: 2022-08-11
Attending: INTERNAL MEDICINE | Admitting: INTERNAL MEDICINE
Payer: MEDICARE

## 2022-08-07 VITALS — SYSTOLIC BLOOD PRESSURE: 100 MMHG | DIASTOLIC BLOOD PRESSURE: 53 MMHG

## 2022-08-07 VITALS — DIASTOLIC BLOOD PRESSURE: 59 MMHG | SYSTOLIC BLOOD PRESSURE: 111 MMHG

## 2022-08-07 VITALS — SYSTOLIC BLOOD PRESSURE: 112 MMHG | DIASTOLIC BLOOD PRESSURE: 92 MMHG

## 2022-08-07 VITALS — SYSTOLIC BLOOD PRESSURE: 111 MMHG | DIASTOLIC BLOOD PRESSURE: 58 MMHG

## 2022-08-07 VITALS — DIASTOLIC BLOOD PRESSURE: 74 MMHG | SYSTOLIC BLOOD PRESSURE: 115 MMHG

## 2022-08-07 VITALS — BODY MASS INDEX: 38.07 KG/M2 | WEIGHT: 281.09 LBS | HEIGHT: 72 IN

## 2022-08-07 VITALS — SYSTOLIC BLOOD PRESSURE: 113 MMHG | DIASTOLIC BLOOD PRESSURE: 57 MMHG

## 2022-08-07 VITALS — DIASTOLIC BLOOD PRESSURE: 55 MMHG | SYSTOLIC BLOOD PRESSURE: 117 MMHG

## 2022-08-07 VITALS — DIASTOLIC BLOOD PRESSURE: 58 MMHG | SYSTOLIC BLOOD PRESSURE: 123 MMHG

## 2022-08-07 DIAGNOSIS — I48.91: ICD-10-CM

## 2022-08-07 DIAGNOSIS — Z87.891: ICD-10-CM

## 2022-08-07 DIAGNOSIS — C34.31: ICD-10-CM

## 2022-08-07 DIAGNOSIS — Z90.49: ICD-10-CM

## 2022-08-07 DIAGNOSIS — I10: ICD-10-CM

## 2022-08-07 DIAGNOSIS — J15.6: Primary | ICD-10-CM

## 2022-08-07 DIAGNOSIS — Z92.21: ICD-10-CM

## 2022-08-07 DIAGNOSIS — J44.0: ICD-10-CM

## 2022-08-07 DIAGNOSIS — Z79.1: ICD-10-CM

## 2022-08-07 DIAGNOSIS — Z99.81: ICD-10-CM

## 2022-08-07 DIAGNOSIS — Z66: ICD-10-CM

## 2022-08-07 DIAGNOSIS — E78.5: ICD-10-CM

## 2022-08-07 DIAGNOSIS — Z92.3: ICD-10-CM

## 2022-08-07 DIAGNOSIS — Z79.52: ICD-10-CM

## 2022-08-07 DIAGNOSIS — J44.1: ICD-10-CM

## 2022-08-07 DIAGNOSIS — C77.1: ICD-10-CM

## 2022-08-07 DIAGNOSIS — G47.33: ICD-10-CM

## 2022-08-07 DIAGNOSIS — Z79.899: ICD-10-CM

## 2022-08-07 DIAGNOSIS — J96.22: ICD-10-CM

## 2022-08-07 DIAGNOSIS — J96.21: ICD-10-CM

## 2022-08-07 DIAGNOSIS — K21.9: ICD-10-CM

## 2022-08-07 DIAGNOSIS — Z85.048: ICD-10-CM

## 2022-08-07 LAB
ALBUMIN SERPL BCG-MCNC: 2.8 GM/DL (ref 3.2–5.2)
ALT SERPL W P-5'-P-CCNC: 15 U/L (ref 12–78)
BASE EXCESS BLDA CALC-SCNC: 3.7 MMOL/L (ref -2–2)
BASE EXCESS BLDV CALC-SCNC: 5.6 MMOL/L (ref -2–2)
BASOPHILS # BLD AUTO: 0 10^3/UL (ref 0–0.2)
BASOPHILS NFR BLD AUTO: 0.2 % (ref 0–1)
BILIRUB CONJ SERPL-MCNC: 0.2 MG/DL (ref 0–0.2)
BILIRUB SERPL-MCNC: 0.5 MG/DL (ref 0.2–1)
BUN SERPL-MCNC: 24 MG/DL (ref 7–18)
CALCIUM SERPL-MCNC: 9.5 MG/DL (ref 8.8–10.2)
CHLORIDE SERPL-SCNC: 100 MEQ/L (ref 98–107)
CO2 BLDA CALC-SCNC: 36.1 MEQ/L (ref 23–31)
CO2 BLDV CALC-SCNC: 37.6 MEQ/L (ref 24–28)
CO2 SERPL-SCNC: 34 MEQ/L (ref 21–32)
CREAT SERPL-MCNC: 1.07 MG/DL (ref 0.7–1.3)
EOSINOPHIL # BLD AUTO: 0.1 10^3/UL (ref 0–0.5)
EOSINOPHIL NFR BLD AUTO: 0.3 % (ref 0–3)
GFR SERPL CREATININE-BSD FRML MDRD: > 60 ML/MIN/{1.73_M2} (ref 49–?)
GLUCOSE SERPL-MCNC: 163 MG/DL (ref 70–100)
HCO3 BLDA-SCNC: 33.7 MEQ/L (ref 22–26)
HCO3 BLDV-SCNC: 35.2 MEQ/L (ref 23–27)
HCO3 STD BLDA-SCNC: 27.8 MEQ/L (ref 22–26)
HCO3 STD BLDV-SCNC: 28.9 MEQ/L
HCT VFR BLD AUTO: 44.1 % (ref 42–52)
HGB BLD-MCNC: 13.4 G/DL (ref 13.5–17.5)
LYMPHOCYTES # BLD AUTO: 0.4 10^3/UL (ref 1.5–5)
LYMPHOCYTES NFR BLD AUTO: 2.1 % (ref 24–44)
MCH RBC QN AUTO: 30.7 PG (ref 27–33)
MCHC RBC AUTO-ENTMCNC: 30.4 G/DL (ref 32–36.5)
MCV RBC AUTO: 101.1 FL (ref 80–96)
MONOCYTES # BLD AUTO: 0.9 10^3/UL (ref 0–0.8)
MONOCYTES NFR BLD AUTO: 5.1 % (ref 2–8)
NEUTROPHILS # BLD AUTO: 16.9 10^3/UL (ref 1.5–8.5)
NEUTROPHILS NFR BLD AUTO: 91.6 % (ref 36–66)
NT-PRO BNP: 448 PG/ML (ref ?–125)
PCO2 BLDA: 79.6 MMHG (ref 35–45)
PCO2 BLDV: 77.2 MMHG (ref 38–50)
PH BLDA: 7.24 UNITS (ref 7.35–7.45)
PH BLDV: 7.28 UNITS (ref 7.33–7.43)
PLATELET # BLD AUTO: 130 10^3/UL (ref 150–450)
PO2 BLDA: 128.3 MMHG (ref 75–100)
PO2 BLDV: 39.7 MMHG (ref 30–50)
POTASSIUM SERPL-SCNC: 4.7 MEQ/L (ref 3.5–5.1)
PROT SERPL-MCNC: 6.9 GM/DL (ref 6.4–8.2)
RBC # BLD AUTO: 4.36 10^6/UL (ref 4.3–6.1)
SAO2 % BLDA: 97.8 % (ref 95–99)
SAO2 % BLDV: 75.1 % (ref 60–80)
SODIUM SERPL-SCNC: 136 MEQ/L (ref 136–145)
T4 SERPL-MCNC: 6.6 UG/DL (ref 4.5–12)
TSH SERPL DL<=0.005 MIU/L-ACNC: 1.99 UIU/ML (ref 0.36–3.74)
WBC # BLD AUTO: 18.4 10^3/UL (ref 4–10)

## 2022-08-07 RX ADMIN — IPRATROPIUM BROMIDE AND ALBUTEROL SCH PUFF: 20; 100 SPRAY, METERED RESPIRATORY (INHALATION) at 08:15

## 2022-08-07 RX ADMIN — IPRATROPIUM BROMIDE AND ALBUTEROL SULFATE SCH ML: .5; 3 SOLUTION RESPIRATORY (INHALATION) at 19:20

## 2022-08-07 RX ADMIN — IPRATROPIUM BROMIDE AND ALBUTEROL SCH PUFF: 20; 100 SPRAY, METERED RESPIRATORY (INHALATION) at 07:55

## 2022-08-07 RX ADMIN — IPRATROPIUM BROMIDE AND ALBUTEROL SCH PUFF: 20; 100 SPRAY, METERED RESPIRATORY (INHALATION) at 08:30

## 2022-08-07 RX ADMIN — METHYLPREDNISOLONE SODIUM SUCCINATE SCH MG: 40 INJECTION, POWDER, FOR SOLUTION INTRAMUSCULAR; INTRAVENOUS at 14:47

## 2022-08-07 RX ADMIN — DEXTROSE MONOHYDRATE SCH MG: 50 INJECTION, SOLUTION INTRAVENOUS at 14:47

## 2022-08-07 RX ADMIN — IPRATROPIUM BROMIDE AND ALBUTEROL SULFATE SCH ML: .5; 3 SOLUTION RESPIRATORY (INHALATION) at 11:44

## 2022-08-07 RX ADMIN — IPRATROPIUM BROMIDE AND ALBUTEROL SULFATE SCH ML: .5; 3 SOLUTION RESPIRATORY (INHALATION) at 15:11

## 2022-08-07 RX ADMIN — METHYLPREDNISOLONE SODIUM SUCCINATE SCH MG: 40 INJECTION, POWDER, FOR SOLUTION INTRAMUSCULAR; INTRAVENOUS at 20:30

## 2022-08-08 VITALS — SYSTOLIC BLOOD PRESSURE: 104 MMHG | DIASTOLIC BLOOD PRESSURE: 68 MMHG

## 2022-08-08 VITALS — SYSTOLIC BLOOD PRESSURE: 120 MMHG | DIASTOLIC BLOOD PRESSURE: 61 MMHG

## 2022-08-08 VITALS — DIASTOLIC BLOOD PRESSURE: 59 MMHG | SYSTOLIC BLOOD PRESSURE: 110 MMHG

## 2022-08-08 VITALS — DIASTOLIC BLOOD PRESSURE: 65 MMHG | SYSTOLIC BLOOD PRESSURE: 115 MMHG

## 2022-08-08 VITALS — DIASTOLIC BLOOD PRESSURE: 58 MMHG | SYSTOLIC BLOOD PRESSURE: 119 MMHG

## 2022-08-08 VITALS — SYSTOLIC BLOOD PRESSURE: 120 MMHG | DIASTOLIC BLOOD PRESSURE: 90 MMHG

## 2022-08-08 VITALS — DIASTOLIC BLOOD PRESSURE: 50 MMHG | SYSTOLIC BLOOD PRESSURE: 98 MMHG

## 2022-08-08 VITALS — DIASTOLIC BLOOD PRESSURE: 59 MMHG | SYSTOLIC BLOOD PRESSURE: 115 MMHG

## 2022-08-08 VITALS — DIASTOLIC BLOOD PRESSURE: 52 MMHG | SYSTOLIC BLOOD PRESSURE: 97 MMHG

## 2022-08-08 LAB
BASE EXCESS BLDA CALC-SCNC: 4.5 MMOL/L (ref -2–2)
BUN SERPL-MCNC: 33 MG/DL (ref 7–18)
CALCIUM SERPL-MCNC: 9.3 MG/DL (ref 8.8–10.2)
CHLORIDE SERPL-SCNC: 102 MEQ/L (ref 98–107)
CO2 BLDA CALC-SCNC: 39.1 MEQ/L (ref 23–31)
CO2 SERPL-SCNC: 31 MEQ/L (ref 21–32)
CREAT SERPL-MCNC: 1.02 MG/DL (ref 0.7–1.3)
GFR SERPL CREATININE-BSD FRML MDRD: > 60 ML/MIN/{1.73_M2} (ref 49–?)
GLUCOSE SERPL-MCNC: 173 MG/DL (ref 70–100)
HCO3 BLDA-SCNC: 36.1 MEQ/L (ref 22–26)
HCO3 STD BLDA-SCNC: 28.5 MEQ/L (ref 22–26)
HCT VFR BLD AUTO: 42.5 % (ref 42–52)
HGB BLD-MCNC: 12.5 G/DL (ref 13.5–17.5)
MCH RBC QN AUTO: 30 PG (ref 27–33)
MCHC RBC AUTO-ENTMCNC: 29.4 G/DL (ref 32–36.5)
MCV RBC AUTO: 101.9 FL (ref 80–96)
PCO2 BLDA: 97.1 MMHG (ref 35–45)
PH BLDA: 7.19 UNITS (ref 7.35–7.45)
PLATELET # BLD AUTO: 124 10^3/UL (ref 150–450)
PO2 BLDA: 136 MMHG (ref 75–100)
POTASSIUM SERPL-SCNC: 5.2 MEQ/L (ref 3.5–5.1)
RBC # BLD AUTO: 4.17 10^6/UL (ref 4.3–6.1)
SAO2 % BLDA: 98.2 % (ref 95–99)
SODIUM SERPL-SCNC: 136 MEQ/L (ref 136–145)
WBC # BLD AUTO: 13.4 10^3/UL (ref 4–10)

## 2022-08-08 RX ADMIN — DEXTROSE MONOHYDRATE SCH MLS/HR: 50 INJECTION, SOLUTION INTRAVENOUS at 13:49

## 2022-08-08 RX ADMIN — METHYLPREDNISOLONE SODIUM SUCCINATE SCH MG: 40 INJECTION, POWDER, FOR SOLUTION INTRAMUSCULAR; INTRAVENOUS at 20:08

## 2022-08-08 RX ADMIN — IPRATROPIUM BROMIDE AND ALBUTEROL SULFATE SCH ML: .5; 3 SOLUTION RESPIRATORY (INHALATION) at 12:44

## 2022-08-08 RX ADMIN — BUDESONIDE AND FORMOTEROL FUMARATE DIHYDRATE SCH PUFF: 160; 4.5 AEROSOL RESPIRATORY (INHALATION) at 15:13

## 2022-08-08 RX ADMIN — CEFTRIAXONE SCH MLS/HR: 1 INJECTION, POWDER, FOR SOLUTION INTRAMUSCULAR; INTRAVENOUS at 11:59

## 2022-08-08 RX ADMIN — METHYLPREDNISOLONE SODIUM SUCCINATE SCH MG: 40 INJECTION, POWDER, FOR SOLUTION INTRAMUSCULAR; INTRAVENOUS at 02:25

## 2022-08-08 RX ADMIN — BUDESONIDE AND FORMOTEROL FUMARATE DIHYDRATE SCH PUFF: 160; 4.5 AEROSOL RESPIRATORY (INHALATION) at 19:14

## 2022-08-08 RX ADMIN — LISINOPRIL SCH MG: 40 TABLET ORAL at 08:52

## 2022-08-08 RX ADMIN — IPRATROPIUM BROMIDE AND ALBUTEROL SULFATE SCH ML: .5; 3 SOLUTION RESPIRATORY (INHALATION) at 16:39

## 2022-08-08 RX ADMIN — DEXTROSE MONOHYDRATE SCH MG: 50 INJECTION, SOLUTION INTRAVENOUS at 08:52

## 2022-08-08 RX ADMIN — TIOTROPIUM BROMIDE SCH INHALATION: 18 CAPSULE ORAL; RESPIRATORY (INHALATION) at 15:13

## 2022-08-08 RX ADMIN — METHYLPREDNISOLONE SODIUM SUCCINATE SCH MG: 40 INJECTION, POWDER, FOR SOLUTION INTRAMUSCULAR; INTRAVENOUS at 08:52

## 2022-08-08 RX ADMIN — IPRATROPIUM BROMIDE AND ALBUTEROL SULFATE SCH ML: .5; 3 SOLUTION RESPIRATORY (INHALATION) at 19:15

## 2022-08-08 RX ADMIN — IPRATROPIUM BROMIDE AND ALBUTEROL SULFATE SCH ML: .5; 3 SOLUTION RESPIRATORY (INHALATION) at 08:36

## 2022-08-08 RX ADMIN — METHYLPREDNISOLONE SODIUM SUCCINATE SCH MG: 40 INJECTION, POWDER, FOR SOLUTION INTRAMUSCULAR; INTRAVENOUS at 13:49

## 2022-08-09 VITALS — DIASTOLIC BLOOD PRESSURE: 64 MMHG | SYSTOLIC BLOOD PRESSURE: 128 MMHG

## 2022-08-09 VITALS — SYSTOLIC BLOOD PRESSURE: 104 MMHG | DIASTOLIC BLOOD PRESSURE: 77 MMHG

## 2022-08-09 VITALS — SYSTOLIC BLOOD PRESSURE: 110 MMHG | DIASTOLIC BLOOD PRESSURE: 50 MMHG

## 2022-08-09 VITALS — DIASTOLIC BLOOD PRESSURE: 63 MMHG | SYSTOLIC BLOOD PRESSURE: 113 MMHG

## 2022-08-09 VITALS — DIASTOLIC BLOOD PRESSURE: 59 MMHG | SYSTOLIC BLOOD PRESSURE: 112 MMHG

## 2022-08-09 VITALS — DIASTOLIC BLOOD PRESSURE: 62 MMHG | SYSTOLIC BLOOD PRESSURE: 94 MMHG

## 2022-08-09 VITALS — DIASTOLIC BLOOD PRESSURE: 82 MMHG | SYSTOLIC BLOOD PRESSURE: 128 MMHG

## 2022-08-09 VITALS — SYSTOLIC BLOOD PRESSURE: 132 MMHG | DIASTOLIC BLOOD PRESSURE: 84 MMHG

## 2022-08-09 VITALS — SYSTOLIC BLOOD PRESSURE: 106 MMHG | DIASTOLIC BLOOD PRESSURE: 63 MMHG

## 2022-08-09 VITALS — OXYGEN SATURATION: 95 %

## 2022-08-09 LAB
ALBUMIN SERPL BCG-MCNC: 2.7 GM/DL (ref 3.2–5.2)
ALT SERPL W P-5'-P-CCNC: 11 U/L (ref 12–78)
APTT BLD: 26.6 SECONDS (ref 25.9–37)
BILIRUB SERPL-MCNC: 0.2 MG/DL (ref 0.2–1)
BUN SERPL-MCNC: 41 MG/DL (ref 7–18)
CALCIUM SERPL-MCNC: 9.9 MG/DL (ref 8.8–10.2)
CHLORIDE SERPL-SCNC: 104 MEQ/L (ref 98–107)
CO2 SERPL-SCNC: 33 MEQ/L (ref 21–32)
CREAT SERPL-MCNC: 1.08 MG/DL (ref 0.7–1.3)
GFR SERPL CREATININE-BSD FRML MDRD: > 60 ML/MIN/{1.73_M2} (ref 49–?)
GLUCOSE SERPL-MCNC: 214 MG/DL (ref 70–100)
HCT VFR BLD AUTO: 46.8 % (ref 42–52)
HGB BLD-MCNC: 13.9 G/DL (ref 13.5–17.5)
INR PPP: 0.95
MCH RBC QN AUTO: 29.4 PG (ref 27–33)
MCHC RBC AUTO-ENTMCNC: 29.7 G/DL (ref 32–36.5)
MCV RBC AUTO: 98.9 FL (ref 80–96)
PLATELET # BLD AUTO: 153 10^3/UL (ref 150–450)
POTASSIUM SERPL-SCNC: 4.9 MEQ/L (ref 3.5–5.1)
PROT SERPL-MCNC: 7.1 GM/DL (ref 6.4–8.2)
PROTHROMBIN TIME: 13.1 SECONDS (ref 12.7–14.5)
RBC # BLD AUTO: 4.73 10^6/UL (ref 4.3–6.1)
SODIUM SERPL-SCNC: 142 MEQ/L (ref 136–145)
WBC # BLD AUTO: 17.1 10^3/UL (ref 4–10)

## 2022-08-09 RX ADMIN — BUDESONIDE AND FORMOTEROL FUMARATE DIHYDRATE SCH PUFF: 160; 4.5 AEROSOL RESPIRATORY (INHALATION) at 08:27

## 2022-08-09 RX ADMIN — DEXTROSE MONOHYDRATE SCH MG: 50 INJECTION, SOLUTION INTRAVENOUS at 08:15

## 2022-08-09 RX ADMIN — IPRATROPIUM BROMIDE AND ALBUTEROL SULFATE SCH ML: .5; 3 SOLUTION RESPIRATORY (INHALATION) at 15:15

## 2022-08-09 RX ADMIN — TIOTROPIUM BROMIDE SCH INHALATION: 18 CAPSULE ORAL; RESPIRATORY (INHALATION) at 08:27

## 2022-08-09 RX ADMIN — APIXABAN SCH MG: 5 TABLET, FILM COATED ORAL at 11:43

## 2022-08-09 RX ADMIN — DEXTROSE MONOHYDRATE SCH MLS/HR: 50 INJECTION, SOLUTION INTRAVENOUS at 12:44

## 2022-08-09 RX ADMIN — LISINOPRIL SCH MG: 40 TABLET ORAL at 08:15

## 2022-08-09 RX ADMIN — METOPROLOL TARTRATE SCH MG: 25 TABLET, FILM COATED ORAL at 17:44

## 2022-08-09 RX ADMIN — METOPROLOL TARTRATE SCH MG: 25 TABLET, FILM COATED ORAL at 23:01

## 2022-08-09 RX ADMIN — BUDESONIDE AND FORMOTEROL FUMARATE DIHYDRATE SCH PUFF: 160; 4.5 AEROSOL RESPIRATORY (INHALATION) at 19:16

## 2022-08-09 RX ADMIN — IPRATROPIUM BROMIDE AND ALBUTEROL SULFATE SCH ML: .5; 3 SOLUTION RESPIRATORY (INHALATION) at 08:00

## 2022-08-09 RX ADMIN — IPRATROPIUM BROMIDE AND ALBUTEROL SULFATE SCH ML: .5; 3 SOLUTION RESPIRATORY (INHALATION) at 19:16

## 2022-08-09 RX ADMIN — IPRATROPIUM BROMIDE AND ALBUTEROL SULFATE SCH ML: .5; 3 SOLUTION RESPIRATORY (INHALATION) at 11:09

## 2022-08-09 RX ADMIN — APIXABAN SCH MG: 5 TABLET, FILM COATED ORAL at 20:18

## 2022-08-09 RX ADMIN — CEFTRIAXONE SCH MLS/HR: 1 INJECTION, POWDER, FOR SOLUTION INTRAMUSCULAR; INTRAVENOUS at 11:43

## 2022-08-10 VITALS — DIASTOLIC BLOOD PRESSURE: 67 MMHG | SYSTOLIC BLOOD PRESSURE: 120 MMHG

## 2022-08-10 VITALS — DIASTOLIC BLOOD PRESSURE: 76 MMHG | SYSTOLIC BLOOD PRESSURE: 131 MMHG

## 2022-08-10 VITALS — DIASTOLIC BLOOD PRESSURE: 60 MMHG | SYSTOLIC BLOOD PRESSURE: 150 MMHG

## 2022-08-10 VITALS — SYSTOLIC BLOOD PRESSURE: 146 MMHG | DIASTOLIC BLOOD PRESSURE: 83 MMHG

## 2022-08-10 VITALS — DIASTOLIC BLOOD PRESSURE: 85 MMHG | SYSTOLIC BLOOD PRESSURE: 114 MMHG

## 2022-08-10 VITALS — SYSTOLIC BLOOD PRESSURE: 118 MMHG | DIASTOLIC BLOOD PRESSURE: 62 MMHG

## 2022-08-10 LAB
ALBUMIN SERPL BCG-MCNC: 2.8 GM/DL (ref 3.2–5.2)
ALT SERPL W P-5'-P-CCNC: 16 U/L (ref 12–78)
BILIRUB SERPL-MCNC: 0.3 MG/DL (ref 0.2–1)
BUN SERPL-MCNC: 36 MG/DL (ref 7–18)
CALCIUM SERPL-MCNC: 9.4 MG/DL (ref 8.8–10.2)
CHLORIDE SERPL-SCNC: 102 MEQ/L (ref 98–107)
CO2 SERPL-SCNC: 39 MEQ/L (ref 21–32)
CREAT SERPL-MCNC: 0.92 MG/DL (ref 0.7–1.3)
GFR SERPL CREATININE-BSD FRML MDRD: > 60 ML/MIN/{1.73_M2} (ref 49–?)
GLUCOSE SERPL-MCNC: 100 MG/DL (ref 70–100)
HCT VFR BLD AUTO: 45.6 % (ref 42–52)
HGB BLD-MCNC: 13.6 G/DL (ref 13.5–17.5)
MCH RBC QN AUTO: 29.6 PG (ref 27–33)
MCHC RBC AUTO-ENTMCNC: 29.8 G/DL (ref 32–36.5)
MCV RBC AUTO: 99.1 FL (ref 80–96)
NT-PRO BNP: 3796 PG/ML (ref ?–125)
PLATELET # BLD AUTO: 157 10^3/UL (ref 150–450)
POTASSIUM SERPL-SCNC: 4.8 MEQ/L (ref 3.5–5.1)
PROT SERPL-MCNC: 6.1 GM/DL (ref 6.4–8.2)
RBC # BLD AUTO: 4.6 10^6/UL (ref 4.3–6.1)
SODIUM SERPL-SCNC: 142 MEQ/L (ref 136–145)
WBC # BLD AUTO: 11.9 10^3/UL (ref 4–10)

## 2022-08-10 RX ADMIN — METOPROLOL TARTRATE SCH MG: 25 TABLET, FILM COATED ORAL at 11:33

## 2022-08-10 RX ADMIN — IPRATROPIUM BROMIDE AND ALBUTEROL SULFATE SCH ML: .5; 3 SOLUTION RESPIRATORY (INHALATION) at 07:19

## 2022-08-10 RX ADMIN — IPRATROPIUM BROMIDE AND ALBUTEROL SULFATE SCH ML: .5; 3 SOLUTION RESPIRATORY (INHALATION) at 20:00

## 2022-08-10 RX ADMIN — DEXTROSE MONOHYDRATE SCH MG: 50 INJECTION, SOLUTION INTRAVENOUS at 09:09

## 2022-08-10 RX ADMIN — METOPROLOL TARTRATE SCH MG: 25 TABLET, FILM COATED ORAL at 05:03

## 2022-08-10 RX ADMIN — BUDESONIDE AND FORMOTEROL FUMARATE DIHYDRATE SCH PUFF: 160; 4.5 AEROSOL RESPIRATORY (INHALATION) at 07:19

## 2022-08-10 RX ADMIN — METOPROLOL TARTRATE SCH MG: 25 TABLET, FILM COATED ORAL at 23:07

## 2022-08-10 RX ADMIN — IPRATROPIUM BROMIDE AND ALBUTEROL SULFATE SCH ML: .5; 3 SOLUTION RESPIRATORY (INHALATION) at 12:09

## 2022-08-10 RX ADMIN — CEFTRIAXONE SCH MLS/HR: 1 INJECTION, POWDER, FOR SOLUTION INTRAMUSCULAR; INTRAVENOUS at 11:32

## 2022-08-10 RX ADMIN — IPRATROPIUM BROMIDE AND ALBUTEROL SULFATE SCH ML: .5; 3 SOLUTION RESPIRATORY (INHALATION) at 14:45

## 2022-08-10 RX ADMIN — APIXABAN SCH MG: 5 TABLET, FILM COATED ORAL at 09:09

## 2022-08-10 RX ADMIN — BUDESONIDE AND FORMOTEROL FUMARATE DIHYDRATE SCH PUFF: 160; 4.5 AEROSOL RESPIRATORY (INHALATION) at 20:23

## 2022-08-10 RX ADMIN — TIOTROPIUM BROMIDE SCH INHALATION: 18 CAPSULE ORAL; RESPIRATORY (INHALATION) at 07:19

## 2022-08-10 RX ADMIN — APIXABAN SCH MG: 5 TABLET, FILM COATED ORAL at 21:34

## 2022-08-10 RX ADMIN — METOPROLOL TARTRATE SCH MG: 25 TABLET, FILM COATED ORAL at 17:22

## 2022-08-11 VITALS — DIASTOLIC BLOOD PRESSURE: 74 MMHG | SYSTOLIC BLOOD PRESSURE: 154 MMHG

## 2022-08-11 VITALS — SYSTOLIC BLOOD PRESSURE: 154 MMHG | DIASTOLIC BLOOD PRESSURE: 74 MMHG

## 2022-08-11 VITALS — SYSTOLIC BLOOD PRESSURE: 146 MMHG | DIASTOLIC BLOOD PRESSURE: 83 MMHG

## 2022-08-11 VITALS — DIASTOLIC BLOOD PRESSURE: 60 MMHG | SYSTOLIC BLOOD PRESSURE: 122 MMHG

## 2022-08-11 LAB
ALBUMIN SERPL BCG-MCNC: 2.7 GM/DL (ref 3.2–5.2)
ALT SERPL W P-5'-P-CCNC: 16 U/L (ref 12–78)
BACTERIA ID TEST ISLT QL CULT: (no result)
BACTERIA SPEC BFLD CULT: (no result)
BILIRUB SERPL-MCNC: 0.3 MG/DL (ref 0.2–1)
BUN SERPL-MCNC: 27 MG/DL (ref 7–18)
CALCIUM SERPL-MCNC: 9 MG/DL (ref 8.8–10.2)
CHLORIDE SERPL-SCNC: 99 MEQ/L (ref 98–107)
CO2 SERPL-SCNC: 43 MEQ/L (ref 21–32)
CREAT SERPL-MCNC: 0.81 MG/DL (ref 0.7–1.3)
GFR SERPL CREATININE-BSD FRML MDRD: > 60 ML/MIN/{1.73_M2} (ref 49–?)
GLUCOSE SERPL-MCNC: 143 MG/DL (ref 70–100)
HCT VFR BLD AUTO: 44.5 % (ref 42–52)
HGB BLD-MCNC: 13.6 G/DL (ref 13.5–17.5)
MCH RBC QN AUTO: 30.3 PG (ref 27–33)
MCHC RBC AUTO-ENTMCNC: 30.6 G/DL (ref 32–36.5)
MCV RBC AUTO: 99.1 FL (ref 80–96)
PLATELET # BLD AUTO: 130 10^3/UL (ref 150–450)
POTASSIUM SERPL-SCNC: 4.3 MEQ/L (ref 3.5–5.1)
PROT SERPL-MCNC: 5.8 GM/DL (ref 6.4–8.2)
RBC # BLD AUTO: 4.49 10^6/UL (ref 4.3–6.1)
SODIUM SERPL-SCNC: 142 MEQ/L (ref 136–145)
WBC # BLD AUTO: 9.5 10^3/UL (ref 4–10)

## 2022-08-11 RX ADMIN — METOPROLOL TARTRATE SCH MG: 25 TABLET, FILM COATED ORAL at 05:56

## 2022-08-11 RX ADMIN — IPRATROPIUM BROMIDE AND ALBUTEROL SULFATE SCH ML: .5; 3 SOLUTION RESPIRATORY (INHALATION) at 07:28

## 2022-08-11 RX ADMIN — TIOTROPIUM BROMIDE SCH INHALATION: 18 CAPSULE ORAL; RESPIRATORY (INHALATION) at 07:27

## 2022-08-11 RX ADMIN — APIXABAN SCH MG: 5 TABLET, FILM COATED ORAL at 09:47

## 2022-08-11 RX ADMIN — IPRATROPIUM BROMIDE AND ALBUTEROL SULFATE SCH ML: .5; 3 SOLUTION RESPIRATORY (INHALATION) at 11:10

## 2022-08-11 RX ADMIN — BUDESONIDE AND FORMOTEROL FUMARATE DIHYDRATE SCH PUFF: 160; 4.5 AEROSOL RESPIRATORY (INHALATION) at 07:27

## 2022-08-11 RX ADMIN — DEXTROSE MONOHYDRATE SCH MG: 50 INJECTION, SOLUTION INTRAVENOUS at 09:47

## 2023-09-06 ENCOUNTER — HOSPITAL ENCOUNTER (OUTPATIENT)
Dept: HOSPITAL 53 - M PAL | Age: 68
End: 2023-09-06
Attending: NURSE PRACTITIONER
Payer: MEDICARE

## 2023-09-06 DIAGNOSIS — R53.81: ICD-10-CM

## 2023-09-06 DIAGNOSIS — K59.00: ICD-10-CM

## 2023-09-06 DIAGNOSIS — Z79.899: ICD-10-CM

## 2023-09-06 DIAGNOSIS — Z66: ICD-10-CM

## 2023-09-06 DIAGNOSIS — R26.81: ICD-10-CM

## 2023-09-06 DIAGNOSIS — M25.561: ICD-10-CM

## 2023-09-06 DIAGNOSIS — Z51.5: ICD-10-CM

## 2023-09-06 DIAGNOSIS — C34.31: Primary | ICD-10-CM

## 2023-09-06 DIAGNOSIS — Z79.52: ICD-10-CM

## 2023-09-06 DIAGNOSIS — Z99.81: ICD-10-CM

## 2023-09-06 DIAGNOSIS — G89.3: ICD-10-CM

## 2023-09-06 DIAGNOSIS — R29.6: ICD-10-CM

## 2023-09-06 DIAGNOSIS — C20: ICD-10-CM

## 2023-09-06 DIAGNOSIS — R53.83: ICD-10-CM

## 2023-09-06 DIAGNOSIS — R63.0: ICD-10-CM

## 2023-09-06 DIAGNOSIS — Z79.01: ICD-10-CM

## 2023-09-06 DIAGNOSIS — Z99.89: ICD-10-CM

## 2023-09-06 DIAGNOSIS — Z79.891: ICD-10-CM

## 2023-09-06 DIAGNOSIS — R06.00: ICD-10-CM

## 2023-09-06 DIAGNOSIS — R68.82: ICD-10-CM

## 2023-09-06 DIAGNOSIS — Z79.51: ICD-10-CM

## 2023-09-06 DIAGNOSIS — J44.9: ICD-10-CM

## 2023-09-12 ENCOUNTER — HOSPITAL ENCOUNTER (OUTPATIENT)
Dept: HOSPITAL 53 - M PAL | Age: 68
End: 2023-09-12
Attending: NURSE PRACTITIONER
Payer: MEDICARE

## 2023-09-12 DIAGNOSIS — R06.00: ICD-10-CM

## 2023-09-12 DIAGNOSIS — Z79.51: ICD-10-CM

## 2023-09-12 DIAGNOSIS — Z79.01: ICD-10-CM

## 2023-09-12 DIAGNOSIS — R63.0: ICD-10-CM

## 2023-09-12 DIAGNOSIS — C20: ICD-10-CM

## 2023-09-12 DIAGNOSIS — K59.00: ICD-10-CM

## 2023-09-12 DIAGNOSIS — Z79.891: ICD-10-CM

## 2023-09-12 DIAGNOSIS — C34.31: Primary | ICD-10-CM

## 2023-09-12 DIAGNOSIS — Z51.5: ICD-10-CM

## 2023-09-12 DIAGNOSIS — Z79.899: ICD-10-CM

## 2023-09-12 DIAGNOSIS — J44.9: ICD-10-CM

## 2023-09-12 DIAGNOSIS — R53.81: ICD-10-CM

## 2023-09-12 DIAGNOSIS — R26.81: ICD-10-CM

## 2023-09-12 DIAGNOSIS — R29.6: ICD-10-CM

## 2023-09-12 DIAGNOSIS — R68.82: ICD-10-CM

## 2023-09-12 DIAGNOSIS — Z79.52: ICD-10-CM

## 2023-09-12 DIAGNOSIS — Z66: ICD-10-CM

## 2023-09-12 DIAGNOSIS — R53.83: ICD-10-CM

## 2023-09-12 DIAGNOSIS — G89.3: ICD-10-CM

## 2023-09-12 DIAGNOSIS — M25.561: ICD-10-CM
